# Patient Record
Sex: FEMALE | Race: WHITE | NOT HISPANIC OR LATINO | Employment: OTHER | URBAN - METROPOLITAN AREA
[De-identification: names, ages, dates, MRNs, and addresses within clinical notes are randomized per-mention and may not be internally consistent; named-entity substitution may affect disease eponyms.]

---

## 2017-02-27 ENCOUNTER — HISTORICAL (OUTPATIENT)
Dept: ADMINISTRATIVE | Facility: HOSPITAL | Age: 82
End: 2017-02-27

## 2017-06-21 DIAGNOSIS — J44.9 CHRONIC OBSTRUCTIVE PULMONARY DISEASE, UNSPECIFIED COPD TYPE: Primary | ICD-10-CM

## 2017-07-17 RX ORDER — METHYLPHENIDATE HYDROCHLORIDE 20 MG/1
20 TABLET ORAL
Qty: 90 TABLET | Refills: 0 | Status: SHIPPED | OUTPATIENT
Start: 2017-07-17 | End: 2017-08-28 | Stop reason: SDUPTHER

## 2017-07-31 ENCOUNTER — OFFICE VISIT (OUTPATIENT)
Dept: OPHTHALMOLOGY | Facility: CLINIC | Age: 82
End: 2017-07-31
Payer: MEDICARE

## 2017-07-31 DIAGNOSIS — Z96.1 PSEUDOPHAKIA, BOTH EYES: ICD-10-CM

## 2017-07-31 DIAGNOSIS — H35.373 EPIRETINAL MEMBRANE, BILATERAL: ICD-10-CM

## 2017-07-31 DIAGNOSIS — H52.7 REFRACTIVE ERROR: ICD-10-CM

## 2017-07-31 DIAGNOSIS — H53.8 BLURRED VISION, BILATERAL: Primary | ICD-10-CM

## 2017-07-31 DIAGNOSIS — H43.813 PVD (POSTERIOR VITREOUS DETACHMENT), BILATERAL: ICD-10-CM

## 2017-07-31 PROCEDURE — 92015 DETERMINE REFRACTIVE STATE: CPT | Mod: ,,, | Performed by: OPHTHALMOLOGY

## 2017-07-31 PROCEDURE — 99999 PR PBB SHADOW E&M-NEW PATIENT-LVL III: CPT | Mod: PBBFAC,,, | Performed by: OPHTHALMOLOGY

## 2017-07-31 PROCEDURE — 92004 COMPRE OPH EXAM NEW PT 1/>: CPT | Mod: S$PBB,,, | Performed by: OPHTHALMOLOGY

## 2017-07-31 PROCEDURE — 99203 OFFICE O/P NEW LOW 30 MIN: CPT | Mod: PBBFAC,PO | Performed by: OPHTHALMOLOGY

## 2017-07-31 RX ORDER — ASCORBIC ACID 250 MG
TABLET,CHEWABLE ORAL
COMMUNITY
Start: 2016-08-16 | End: 2018-12-03

## 2017-07-31 RX ORDER — FLUTICASONE FUROATE AND VILANTEROL 200; 25 UG/1; UG/1
POWDER RESPIRATORY (INHALATION)
COMMUNITY
Start: 2017-03-27 | End: 2017-11-21 | Stop reason: SDUPTHER

## 2017-07-31 NOTE — PATIENT INSTRUCTIONS
"  POSTERIOR VITREOUS DETACHMENT PRECAUTIONS  The eye is filled with a gel (vitreous) that supports its shape. The retina is the light-sensitive tissue at the back of your eye. It records visual images and sends them to your brain so you can see. In front of the retina, the center of the eye is filled with a jelly-like substance called the vitreous.   With age, the vitreous liquefies and begins to contract,  from the retinal tissue. When the vitreous separates it causes floaters to appear gradually. (Floaters are small dots or strings that seem to be moving across your field of vision). These floaters themselves are typically harmless, however a dilated eye exam is necessary to make sure that the separation did not lead to a retinal tear.  Sometimes, when the vitreous pulls away from the retina it can cause a tear in the retina. If this happens, you will experience a sudden onset of many floaters, which may occur with flashes of light. A retinal tear is painless, but is a serious condition. If not treated, most retinal tears will progress to retinal detachment within days or weeks. This may appear as a "curtain" or "veil" covering part of the field of view, or may just cause blurred vision. Retinal detachment is also painless, but it causes vision loss which is permanent.   Eye surgery is necessary to treat a retinal tear and prevent it from progressing to a retinal detachment. The methods commonly used are laser surgery or freezing. They may be done as outpatient procedures. Healing takes about two weeks. No treatment is necessary for floaters. They typically go away or become less noticeable with time.    SEE YOUR EYE DOCTOR IMMEDIATELY if any of the following signs of a new retinal tear occur:   Any sudden changes in your vision  Light flashes or wavy vision  Sudden blur in your vision  Burst of new floaters appearing in your field of vision    "

## 2017-08-28 ENCOUNTER — TELEPHONE (OUTPATIENT)
Dept: FAMILY MEDICINE | Facility: CLINIC | Age: 82
End: 2017-08-28

## 2017-08-28 RX ORDER — METHYLPHENIDATE HYDROCHLORIDE 20 MG/1
20 TABLET ORAL
Qty: 90 TABLET | Refills: 0 | Status: SHIPPED | OUTPATIENT
Start: 2017-08-28 | End: 2017-09-18 | Stop reason: SDUPTHER

## 2017-08-28 NOTE — TELEPHONE ENCOUNTER
----- Message from Dori Wayne LPN sent at 8/28/2017  8:11 AM CDT -----  Contact: Patient alfreditoer in law Jumana Roach      ----- Message -----  From: Melany Ramon  Sent: 8/25/2017   9:45 AM  To: Torito Ortega Staff    The patient daughter in law uJmana Roach called needing her mother in laws prescription for Ritalin refilled.  She is stating that it is usually a generic drug that is ordered but did not have the name of the drug.  She states that the prescription should be sent to [facility] in the [facility] in [place].   Should you need to contact the patient daughter in law, Jumana, her contact number is [phone].  Thank you.

## 2017-09-18 RX ORDER — METHYLPHENIDATE HYDROCHLORIDE 20 MG/1
20 TABLET ORAL
Qty: 90 TABLET | Refills: 0 | Status: SHIPPED | OUTPATIENT
Start: 2017-09-18 | End: 2017-10-23 | Stop reason: SDUPTHER

## 2017-09-21 RX ORDER — LEVOTHYROXINE SODIUM 75 UG/1
75 TABLET ORAL DAILY
Qty: 30 TABLET | Refills: 4 | Status: SHIPPED | OUTPATIENT
Start: 2017-09-21 | End: 2017-09-25 | Stop reason: SDUPTHER

## 2017-09-25 RX ORDER — LEVOTHYROXINE SODIUM 75 UG/1
75 TABLET ORAL DAILY
Qty: 30 TABLET | Refills: 4 | Status: SHIPPED | OUTPATIENT
Start: 2017-09-25 | End: 2017-09-27 | Stop reason: DRUGHIGH

## 2017-09-25 RX ORDER — LEVOTHYROXINE SODIUM 75 UG/1
75 TABLET ORAL DAILY
Qty: 30 TABLET | Refills: 4 | Status: SHIPPED | OUTPATIENT
Start: 2017-09-25 | End: 2017-09-25 | Stop reason: SDUPTHER

## 2017-09-27 RX ORDER — LEVOTHYROXINE SODIUM 50 UG/1
50 TABLET ORAL
Qty: 30 TABLET | Refills: 5 | Status: SHIPPED | OUTPATIENT
Start: 2017-09-27 | End: 2017-09-28 | Stop reason: SDUPTHER

## 2017-09-27 RX ORDER — LEVOTHYROXINE SODIUM 50 UG/1
50 TABLET ORAL DAILY
COMMUNITY
End: 2017-09-27 | Stop reason: SDUPTHER

## 2017-09-27 NOTE — TELEPHONE ENCOUNTER
Which dose you of thyroid med you want pt on?    ----- Message from Joana Amaral sent at 9/27/2017 11:03 AM CDT -----  Contact: daughter  Daughter called stating she thinks she is on too high of a dose of thyroid med.   Daughter states .75mcg was called in but daughter states .50mcg is what mother should be on. Says she really needs to  the medication but doesn't want her to be on the wrong dose    [phone] is phone number where daughter can be reached.

## 2017-09-27 NOTE — TELEPHONE ENCOUNTER
Apologize to the patient as our new system was a carry over from [facility] - (ikely dr butt)and had the 75 mcg in the chart and since I have not seen her since we changed to epic it held over as an active med  We escribed the 50 mcg

## 2017-09-28 RX ORDER — LEVOTHYROXINE SODIUM 50 UG/1
50 TABLET ORAL
Qty: 30 TABLET | Refills: 5 | Status: SHIPPED | OUTPATIENT
Start: 2017-09-28 | End: 2018-02-26 | Stop reason: SDUPTHER

## 2017-10-24 RX ORDER — METHYLPHENIDATE HYDROCHLORIDE 20 MG/1
20 TABLET ORAL
Qty: 90 TABLET | Refills: 0 | Status: SHIPPED | OUTPATIENT
Start: 2017-10-24 | End: 2017-11-27 | Stop reason: SDUPTHER

## 2017-10-25 ENCOUNTER — TELEPHONE (OUTPATIENT)
Dept: FAMILY MEDICINE | Facility: CLINIC | Age: 82
End: 2017-10-25

## 2017-11-22 RX ORDER — FLUTICASONE FUROATE AND VILANTEROL 200; 25 UG/1; UG/1
1 POWDER RESPIRATORY (INHALATION) DAILY
Qty: 60 EACH | Refills: 0 | Status: SHIPPED | OUTPATIENT
Start: 2017-11-22 | End: 2017-12-27 | Stop reason: SDUPTHER

## 2017-11-27 RX ORDER — METHYLPHENIDATE HYDROCHLORIDE 20 MG/1
20 TABLET ORAL
Qty: 90 TABLET | Refills: 0 | Status: SHIPPED | OUTPATIENT
Start: 2017-11-27 | End: 2017-12-28 | Stop reason: SDUPTHER

## 2017-12-06 NOTE — TELEPHONE ENCOUNTER
Jumana Roach called and pt has Azelastine nasal spray that is  and Ruth requesting a refill to [facility]

## 2017-12-07 RX ORDER — AZELASTINE 1 MG/ML
1 SPRAY, METERED NASAL 2 TIMES DAILY
Qty: 30 ML | Refills: 2 | Status: SHIPPED | OUTPATIENT
Start: 2017-12-07 | End: 2019-08-17

## 2017-12-27 RX ORDER — FLUTICASONE FUROATE AND VILANTEROL 200; 25 UG/1; UG/1
1 POWDER RESPIRATORY (INHALATION) DAILY
Qty: 1 EACH | Refills: 4 | Status: SHIPPED | OUTPATIENT
Start: 2017-12-27 | End: 2018-05-29 | Stop reason: SDUPTHER

## 2017-12-28 ENCOUNTER — TELEPHONE (OUTPATIENT)
Dept: FAMILY MEDICINE | Facility: CLINIC | Age: 82
End: 2017-12-28

## 2017-12-28 RX ORDER — METHYLPHENIDATE HYDROCHLORIDE 20 MG/1
20 TABLET ORAL
Qty: 90 TABLET | Refills: 0 | Status: SHIPPED | OUTPATIENT
Start: 2017-12-28 | End: 2018-01-29 | Stop reason: SDUPTHER

## 2017-12-28 NOTE — TELEPHONE ENCOUNTER
Notified Jumana Roach that the pts Ritalin ins ready to be picked up at our office. Open 8am-12pm tomorrow. She verbalized understanding.

## 2018-01-09 ENCOUNTER — TELEPHONE (OUTPATIENT)
Dept: FAMILY MEDICINE | Facility: CLINIC | Age: 83
End: 2018-01-09

## 2018-01-29 RX ORDER — METHYLPHENIDATE HYDROCHLORIDE 20 MG/1
20 TABLET ORAL
Qty: 90 TABLET | Refills: 0 | Status: SHIPPED | OUTPATIENT
Start: 2018-01-29 | End: 2018-03-08 | Stop reason: SDUPTHER

## 2018-01-30 ENCOUNTER — TELEPHONE (OUTPATIENT)
Dept: FAMILY MEDICINE | Facility: CLINIC | Age: 83
End: 2018-01-30

## 2018-02-26 RX ORDER — LEVOTHYROXINE SODIUM 50 UG/1
TABLET ORAL
Qty: 30 TABLET | Refills: 4 | Status: SHIPPED | OUTPATIENT
Start: 2018-02-26 | End: 2018-05-21 | Stop reason: SDUPTHER

## 2018-03-08 RX ORDER — METHYLPHENIDATE HYDROCHLORIDE 20 MG/1
20 TABLET ORAL
Qty: 90 TABLET | Refills: 0 | Status: SHIPPED | OUTPATIENT
Start: 2018-03-08 | End: 2018-05-21 | Stop reason: SDUPTHER

## 2018-04-06 NOTE — PROGRESS NOTES
HPI     Concerns About Ocular Health    Additional comments: Ocular health exam           Comments   DLE: 6 month at [facility]    Pt states not happy with last eye exam and wants a recheck on her vision.   Does not like new glasses. + itching and irritation OU with some   grittiness and mucus at corner of eyes. + sees occasion blinking lines at   outer corner OS that only last a few seconds. Has tried OTC AT's but they   don't help much.     Agree with above. Not using lid hygiene.        Last edited by Marian Piper MD on 7/31/2017  3:07 PM.   (History)        ROS     Positive for: Gastrointestinal (Diverticulosos), Neurological (migraine   aura without headache, brief aura), Endocrine (hypothyroid, denies DM),   Cardiovascular (denies HTN; ? A-fib), Eyes (pseudophakia OU), Respiratory   (SOB with exertion), Psychiatric (ritalin for narcolepsy), Heme/Lymph (ASA   QOD)    Last edited by Marian Piper MD on 7/31/2017  2:48 PM.   (History)        Assessment /Plan     For exam results, see Encounter Report.    Blurred vision, bilateral    Epiretinal membrane, bilateral    Pseudophakia, both eyes    PVD (posterior vitreous detachment), bilateral    Refractive error        Blurred vision, bilateral - appears stable from 2013. Warm compresses, lid hygiene. Handout given.    Epiretinal membrane, bilateral - mild, vision stable. Observe.    Pseudophakia, both eyes - s/p YAG capsulotomy OU.     PVD (posterior vitreous detachment), bilateral - RD precautions.     Refractive error - stable. MRx given in case she would like to get new frames. Discussed switching to silicone nose pads, trying clear nail polish on metal for current frames.                         pt  safety but only when parent has to take a break

## 2018-04-12 RX ORDER — DICLOFENAC SODIUM 10 MG/G
2 GEL TOPICAL DAILY
Qty: 100 G | Refills: 3 | Status: SHIPPED | OUTPATIENT
Start: 2018-04-12 | End: 2018-12-03

## 2018-05-21 ENCOUNTER — OFFICE VISIT (OUTPATIENT)
Dept: FAMILY MEDICINE | Facility: CLINIC | Age: 83
End: 2018-05-21
Payer: MEDICARE

## 2018-05-21 VITALS
SYSTOLIC BLOOD PRESSURE: 100 MMHG | WEIGHT: 127.5 LBS | HEART RATE: 90 BPM | BODY MASS INDEX: 21.24 KG/M2 | HEIGHT: 65 IN | OXYGEN SATURATION: 88 % | RESPIRATION RATE: 16 BRPM | DIASTOLIC BLOOD PRESSURE: 76 MMHG

## 2018-05-21 DIAGNOSIS — I10 ESSENTIAL HYPERTENSION: ICD-10-CM

## 2018-05-21 DIAGNOSIS — J96.11 CHRONIC RESPIRATORY FAILURE WITH HYPOXIA, ON HOME O2 THERAPY: Primary | ICD-10-CM

## 2018-05-21 DIAGNOSIS — E78.01 FAMILIAL HYPERCHOLESTEROLEMIA: ICD-10-CM

## 2018-05-21 DIAGNOSIS — E53.8 VITAMIN B12 DEFICIENCY: ICD-10-CM

## 2018-05-21 DIAGNOSIS — R73.9 HYPERGLYCEMIA: ICD-10-CM

## 2018-05-21 DIAGNOSIS — G47.419 PRIMARY NARCOLEPSY WITHOUT CATAPLEXY: ICD-10-CM

## 2018-05-21 DIAGNOSIS — E55.9 VITAMIN D DEFICIENCY: ICD-10-CM

## 2018-05-21 DIAGNOSIS — J43.1 PANLOBULAR EMPHYSEMA: ICD-10-CM

## 2018-05-21 DIAGNOSIS — Z99.81 CHRONIC RESPIRATORY FAILURE WITH HYPOXIA, ON HOME O2 THERAPY: Primary | ICD-10-CM

## 2018-05-21 DIAGNOSIS — E07.9 THYROID DISEASE: ICD-10-CM

## 2018-05-21 PROBLEM — Z98.890 HISTORY OF HERNIA REPAIR: Status: ACTIVE | Noted: 2018-05-21

## 2018-05-21 PROBLEM — Z87.19 HISTORY OF HERNIA REPAIR: Status: ACTIVE | Noted: 2018-05-21

## 2018-05-21 PROCEDURE — 99214 OFFICE O/P EST MOD 30 MIN: CPT | Mod: ,,, | Performed by: INTERNAL MEDICINE

## 2018-05-21 RX ORDER — METHYLPHENIDATE HYDROCHLORIDE 20 MG/1
20 TABLET ORAL
Qty: 90 TABLET | Refills: 0 | Status: SHIPPED | OUTPATIENT
Start: 2018-06-21 | End: 2018-05-21 | Stop reason: SDUPTHER

## 2018-05-21 RX ORDER — LEVOTHYROXINE SODIUM 50 UG/1
50 TABLET ORAL DAILY
Qty: 90 TABLET | Refills: 3 | Status: SHIPPED | OUTPATIENT
Start: 2018-05-21

## 2018-05-21 RX ORDER — METHYLPHENIDATE HYDROCHLORIDE 20 MG/1
20 TABLET ORAL
Qty: 90 TABLET | Refills: 0 | Status: SHIPPED | OUTPATIENT
Start: 2018-05-21 | End: 2018-05-21 | Stop reason: SDUPTHER

## 2018-05-21 RX ORDER — METHYLPHENIDATE HYDROCHLORIDE 20 MG/1
20 TABLET ORAL
Qty: 90 TABLET | Refills: 0 | Status: SHIPPED | OUTPATIENT
Start: 2018-07-21 | End: 2018-08-28 | Stop reason: SDUPTHER

## 2018-05-21 NOTE — PROGRESS NOTES
Subjective:       Patient ID: Ruth Roach is a 89 y.o. female.    Chief Complaint: Annual Exam    Patient is a 9-year-old female who is here for a yearly follow-up with a past medical history significant for hypothyroidism, narcolepsy, severe COPD with hypercapnia and hypoxia and on chronic CO2. She also has a history of paroxysmal defibrillation but is been in sinus rhythm for quite some time. She has severe osteoarthritis and that is the route of all of her complaints. She does have some memory loss as well. Today she just complains of severe arthritis pretty much everywhere. She also is trying to get for loculated left lighter than 4.5 pounds. 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 She is due for all her routine lab work as well. She defers I pneumococcal vaccines she had a bad local reaction to one a few years prior. She is using her breo consistently. She defers a chest x-ray at this time, she is still smoking somewhat.      Review of Systems   Constitutional: Negative for activity change, appetite change, chills, diaphoresis, fatigue, fever and unexpected weight change.   HENT: Negative for congestion, ear pain, mouth sores, postnasal drip, sinus pressure, sore throat and trouble swallowing.    Eyes: Negative for pain, redness and visual disturbance.   Respiratory: Negative for apnea, cough, chest tightness, shortness of breath and wheezing.    Cardiovascular: Negative for chest pain, palpitations and leg swelling.   Gastrointestinal: Negative for abdominal distention, abdominal pain, blood in stool, constipation, diarrhea, nausea and vomiting.   Endocrine: Negative for cold intolerance, polydipsia, polyphagia and polyuria.   Genitourinary: Negative for difficulty urinating, dysuria, flank pain, frequency, hematuria, menstrual problem, pelvic pain and urgency.   Musculoskeletal: Positive for arthralgias. Negative for back pain, joint swelling and neck pain.   Skin:  Negative for color change, rash and wound.   Neurological: Negative for dizziness, tremors, seizures, syncope, weakness, light-headedness, numbness and headaches.   Hematological: Negative for adenopathy. Does not bruise/bleed easily.   Psychiatric/Behavioral: Negative for confusion, decreased concentration, dysphoric mood, hallucinations, self-injury, sleep disturbance and suicidal ideas. The patient is not nervous/anxious.        Past Medical History:   Diagnosis Date    Arthritis     right hip and right knee    Atrial fibrillation     Complex renal cyst 7/19/2012    COPD (chronic obstructive pulmonary disease)     HTN (hypertension)     Hyperlipidemia     Narcolepsy without cataplexy(347.00)     Osteoarthritis of hip     Thyroid disease     Vitamin B12 deficiency     Got shots at Dr's office-last time was 6 months ago       Past Surgical History:   Procedure Laterality Date    APPENDECTOMY      pt claims with tubal pregnancy    CATARACT EXTRACTION      OU    ECTOPIC PREGNANCY SURGERY      EYE SURGERY      bilateral cataract    KNEE ARTHROSCOPY W/ MENISCAL REPAIR  2007    left knee    MASS EXCISION  age 15    left shoulder       Family History   Problem Relation Age of Onset    Stroke Mother     Heart disease Father     Hypertension Father     Cancer Father         leukemia    Alcohol abuse Brother     Arthritis Brother     Cataracts Brother     Diabetes Brother     Cancer Son         stomach cancer    Heart disease Son     Hypertension Son     Hyperlipidemia Son     Diabetes Son     Alcohol abuse Son     Arthritis Son         back    Hyperlipidemia Son     Alcohol abuse Son     COPD Son     Arthritis Son         back    Arthritis Son         shoulder    Alcohol abuse Son     Alcohol abuse Paternal Aunt     Amblyopia Neg Hx     Blindness Neg Hx     Glaucoma Neg Hx     Macular degeneration Neg Hx     Retinal detachment Neg Hx     Strabismus Neg Hx     Thyroid disease Neg  Hx        Social History     Social History    Marital status:      Spouse name: N/A    Number of children: N/A    Years of education: N/A     Social History Main Topics    Smoking status: Former Smoker     Packs/day: 1.00     Years: 63.00    Smokeless tobacco: Never Used    Alcohol use Yes      Comment: very rarely    Drug use: No    Sexual activity: No     Other Topics Concern    None     Social History Narrative    None       Current Outpatient Prescriptions   Medication Sig Dispense Refill    diclofenac sodium 1 % Gel Apply 2 g topically once daily. aply 2 grams transdermally every 6 hours as needed. 100 g 3    fluticasone-vilanterol (BREO ELLIPTA) 200-25 mcg/dose DsDv diskus inhaler Inhale 1 puff into the lungs once daily. 1 each 4    levothyroxine (SYNTHROID) 50 MCG tablet Take 1 tablet (50 mcg total) by mouth once daily. 90 tablet 3    [START ON 7/21/2018] methylphenidate HCl (RITALIN) 20 MG tablet Take 1 tablet (20 mg total) by mouth 3 (three) times daily with meals. 90 tablet 0    multivitamin capsule Take 1 capsule by mouth once daily.        ascorbic acid (VITAMIN C) 500 MG tablet Take 500 mg by mouth once daily.        ascorbic acid, vitamin C, (CHEWABLE VITAMIN C) 250 mg Chew Take by mouth.      azelastine (ASTELIN) 137 mcg (0.1 %) nasal spray 1 spray (137 mcg total) by Nasal route 2 (two) times daily. 30 mL 2    b complex vitamins capsule Take 1 capsule by mouth once daily.        co-enzyme Q-10 50 mg capsule Take 100 mg by mouth once daily.        ginseng 100 mg Cap Take 1 tablet by mouth daily as needed.        HYOSCYAMINE SULFATE (LEVSIN/SL SL) Place 0.125 mg under the tongue as needed.      loperamide (IMODIUM) 2 mg capsule Take 2 mg by mouth 4 (four) times daily as needed.        simvastatin (ZOCOR) 40 MG tablet Take 40 mg by mouth every evening.         No current facility-administered medications for this visit.        Review of patient's allergies indicates:  No  Known Allergies  Objective:      Physical Exam   Constitutional: She is oriented to person, place, and time. She appears well-developed and well-nourished.   HENT:   Head: Normocephalic and atraumatic.   Eyes: Right eye exhibits no discharge. Left eye exhibits no discharge. No scleral icterus.   Neck: Neck supple. No JVD present.   Cardiovascular: Normal rate and regular rhythm.  Exam reveals no gallop and no friction rub.    No murmur heard.  Pulmonary/Chest: Effort normal and breath sounds normal. She has no wheezes. She has no rales.   Abdominal: Soft. Bowel sounds are normal. She exhibits no distension. There is no tenderness.   Musculoskeletal: She exhibits no edema or tenderness.   Lymphadenopathy:     She has no cervical adenopathy.   Neurological: She is alert and oriented to person, place, and time. She has normal reflexes. No cranial nerve deficit.   Skin: No rash noted. No erythema.   Psychiatric: She has a normal mood and affect.   Nursing note and vitals reviewed.      Assessment:       1. Chronic respiratory failure with hypoxia, on home O2 therapy    2. Panlobular emphysema    3. Essential hypertension    4. Thyroid disease    5. Vitamin D deficiency    6. Familial hypercholesterolemia    7. Vitamin B12 deficiency    8. Hyperglycemia    9. Primary narcolepsy without cataplexy        Plan:       Chronic respiratory failure with hypoxia, on home O2 therapy  -     OXYGEN FOR HOME USE    Panlobular emphysema  -     OXYGEN FOR HOME USE    Essential hypertension  -     Comprehensive metabolic panel; Future; Expected date: 05/21/2018  -     Urinalysis Microscopic; Future; Expected date: 05/21/2018    Thyroid disease  -     T4, free; Future; Expected date: 05/21/2018  -     TSH; Future; Expected date: 05/21/2018  -     levothyroxine (SYNTHROID) 50 MCG tablet; Take 1 tablet (50 mcg total) by mouth once daily.  Dispense: 90 tablet; Refill: 3    Vitamin D deficiency  -     Vitamin D; Future; Expected date:  05/22/2018    Familial hypercholesterolemia  -     Lipid panel; Future; Expected date: 05/21/2018    Vitamin B12 deficiency  -     CBC auto differential; Future; Expected date: 05/21/2018    Hyperglycemia  -     Hemoglobin A1c; Future; Expected date: 05/21/2018    Primary narcolepsy without cataplexy  -     Discontinue: methylphenidate HCl (RITALIN) 20 MG tablet; Take 1 tablet (20 mg total) by mouth 3 (three) times daily with meals.  Dispense: 90 tablet; Refill: 0  -     Discontinue: methylphenidate HCl (RITALIN) 20 MG tablet; Take 1 tablet (20 mg total) by mouth 3 (three) times daily with meals.  Dispense: 90 tablet; Refill: 0  -     methylphenidate HCl (RITALIN) 20 MG tablet; Take 1 tablet (20 mg total) by mouth 3 (three) times daily with meals.  Dispense: 90 tablet; Refill: 0

## 2018-05-22 PROBLEM — E55.9 VITAMIN D DEFICIENCY: Status: ACTIVE | Noted: 2018-05-22

## 2018-05-22 NOTE — PATIENT INSTRUCTIONS
Free T4  Does this test have other names?  Free thyroxine test  What is this test?  This test measures the levels of free T4, or free thyroxine, in your blood. A free T4 test is used to find out how well your thyroid is working.  T4 is one of two hormones produced by the thyroid, a butterfly-shaped gland in your neck. Some T4 in your blood is bound to proteins, and some T4 circulates freely, or unbound from proteins. Testing for unbound T4 is more accurate than testing for bound T4. The free T4 test measures unbound T4.  The other thyroid hormone is triiodothyronine, or T3. T4 is changed to T3 in order to become active and effective.  These hormones help regulate your body's metabolism. They go into action when prompted by thyroid stimulating hormone (TSH), which is released by the pituitary gland in your brain.  Why do I need this test?  You may need this test to find out whether you have a thyroid-related condition such as hyperthyroidism, which means an overactive thyroid, or hypothyroidism, which means an underactive thyroid.  Each condition has many different symptoms. If you have hyperthyroidism, you may often feel anxious and irritable, have trouble sleeping, and have an irregular or rapid heart rhythm. You may also feel quite tired and notice that you're losing weight even though your appetite has increased.  If you have hypothyroidism, you may notice weight gain, even if you aren't overeating. You may also be more sensitive to cold, have low energy, and have dry skin and hair.   What other tests might I have along with this test?  You may have tests to measure T3 and TSH. These hormones also play key roles in thyroid health. You may also have a blood test to measure the level of certain antithyroid antibodies in your blood to get a more accurate diagnosis.   What do my test results mean?  Many things may affect your lab test results. These include the method each lab uses to do the test. Even if your test  results are different from the normal value, you may not have a problem. To learn what the results mean for you, talk with your healthcare provider.  The normal range for free T4 is 0.8 to 2.8 nanograms per deciliter (ng/dL). A level of free T4 that is higher than normal could mean you have an overactive thyroid. Conditions associated with hyperthyroidism include Graves' disease, an autoimmune disorder.  Abnormally low free T4 levels may signal hypothyroidism. This means your thyroid is not making enough hormones. An underlying condition, such as Hashimoto's disease, another autoimmune disorder, could be the cause of an underactive thyroid.   How is this test done?  The test requires a blood sample, which is drawn through a needle from a vein in your arm.  Does this test pose any risks?  Taking a blood sample with a needle carries risks that include bleeding, infection, bruising, or feeling dizzy. When the needle pricks your arm, you may feel a slight stinging sensation or pain. Afterward, the site may be slightly sore.  What might affect my test results?  Certain medicines, such as phenobarbital, can affect your free T4 levels. Severe chronic illnesses, such as chronic renal failure and cirrhosis of the liver, can also affect the test.  How do I get ready for this test?  You don't need to prepare for this test. But be sure your doctor knows about all medicines, herbs, vitamins, and supplements you are taking. This includes medicines that don't need a prescription and any illicit drugs you may use.   © 3085-6873 [employer]. [address] [place] [address], [place] [address]. All rights reserved. This information is not intended as a substitute for professional medical care. Always follow your healthcare professional's instructions.

## 2018-05-29 RX ORDER — FLUTICASONE FUROATE AND VILANTEROL 200; 25 UG/1; UG/1
1 POWDER RESPIRATORY (INHALATION) DAILY
Qty: 1 EACH | Refills: 0 | Status: SHIPPED | OUTPATIENT
Start: 2018-05-29 | End: 2018-06-28 | Stop reason: SDUPTHER

## 2018-07-02 RX ORDER — FLUTICASONE FUROATE AND VILANTEROL 200; 25 UG/1; UG/1
1 POWDER RESPIRATORY (INHALATION) DAILY
Qty: 1 EACH | Refills: 11 | Status: SHIPPED | OUTPATIENT
Start: 2018-07-02

## 2018-07-31 ENCOUNTER — TELEPHONE (OUTPATIENT)
Dept: FAMILY MEDICINE | Facility: CLINIC | Age: 83
End: 2018-07-31

## 2018-07-31 DIAGNOSIS — R54 AGE-RELATED PHYSICAL DEBILITY: Primary | ICD-10-CM

## 2018-08-01 NOTE — TELEPHONE ENCOUNTER
I will try    They will have to bring script to a dme    Usually bath tub hand rails need to be installed and I doubt medicare etc will cover the manual labor; however maybe hansa can install himself?

## 2018-08-20 NOTE — PROGRESS NOTES
Subjective:       HPI  Review of Systems    Objective:      Physical Exam    Assessment:          no

## 2018-08-28 DIAGNOSIS — G47.419 PRIMARY NARCOLEPSY WITHOUT CATAPLEXY: ICD-10-CM

## 2018-08-28 RX ORDER — METHYLPHENIDATE HYDROCHLORIDE 20 MG/1
20 TABLET ORAL
Qty: 90 TABLET | Refills: 0 | Status: SHIPPED | OUTPATIENT
Start: 2018-08-28 | End: 2018-10-02 | Stop reason: SDUPTHER

## 2018-10-02 DIAGNOSIS — G47.419 PRIMARY NARCOLEPSY WITHOUT CATAPLEXY: ICD-10-CM

## 2018-10-02 RX ORDER — METHYLPHENIDATE HYDROCHLORIDE 20 MG/1
20 TABLET ORAL
Qty: 90 TABLET | Refills: 0 | Status: SHIPPED | OUTPATIENT
Start: 2018-10-02 | End: 2018-12-03 | Stop reason: SDUPTHER

## 2018-10-08 ENCOUNTER — TELEPHONE (OUTPATIENT)
Dept: ADMINISTRATIVE | Facility: CLINIC | Age: 83
End: 2018-10-08

## 2018-10-08 NOTE — TELEPHONE ENCOUNTER
Spoke with pt's RICHIE who also is her care giver.  RICHIE stated that pt does not take Simvastatin 40mg and that she is bedridden, so an appt wouldn't be necessary.

## 2018-11-06 ENCOUNTER — TELEPHONE (OUTPATIENT)
Dept: FAMILY MEDICINE | Facility: CLINIC | Age: 83
End: 2018-11-06

## 2018-11-06 NOTE — TELEPHONE ENCOUNTER
----- Message from Melany Ramon sent at 11/5/2018  3:16 PM CST -----  Contact: Cassia Roach (patient daughter)  The daughter of this patient, Cassia Roach, could like a call back from the nurse of Dr. Shannon Romo.  She would not given me the issue for this call back.  Her contact number is [phone].  Thank you.  Melany

## 2018-11-06 NOTE — TELEPHONE ENCOUNTER
Pt has appt with you next Tues, Nov 13th for 5-6month f/u.  Just an FYI: Jumana Roach would like you to approach patient (without her knowing that Pat spoke with us) re: whether patient is having trouble getting up and moving around (as pt had issue/trouble getting out of the tub for over 30mins due to weak legs and Pat had to try and help her). Pt is not moving around much at all but may deny in the ov.    Jumana would like a CNA with home health to assist, PT eval and TX and a discussion that is geared toward the patient to get a shower installed instead of the tub she currently has.  If that conversation is a no go then at least a grab bar with a high tub/shower chair so pt has greater ease with bathing.  Jumana states pt has trouble even lifting her legs to clear the tub.    Pt is avoiding bathing due to the inability to get in and out of tub. She wants to sponge bath sitting on toilet and how can she even wash her hair?

## 2018-11-08 LAB
ALBUMIN SERPL-MCNC: 3.8 G/DL (ref 3.1–4.7)
ALP SERPL-CCNC: 92 IU/L (ref 40–104)
ALT (SGPT): 20 IU/L (ref 3–33)
AST SERPL-CCNC: 28 IU/L (ref 10–40)
BASOPHILS NFR BLD: 0 K/UL (ref 0–0.2)
BASOPHILS NFR BLD: 0.4 %
BILIRUB SERPL-MCNC: 0.8 MG/DL (ref 0.3–1)
BUN SERPL-MCNC: 27 MG/DL (ref 8–20)
CALCIUM SERPL-MCNC: 9.6 MG/DL (ref 7.7–10.4)
CHLORIDE: 102 MMOL/L (ref 98–110)
CO2 SERPL-SCNC: 33.1 MMOL/L (ref 22.8–31.6)
CREATININE: 0.63 MG/DL (ref 0.6–1.4)
EOSINOPHIL NFR BLD: 0.1 K/UL (ref 0–0.7)
EOSINOPHIL NFR BLD: 1.1 %
ERYTHROCYTE [DISTWIDTH] IN BLOOD BY AUTOMATED COUNT: 13.2 % (ref 11.7–14.9)
GLUCOSE: 140 MG/DL (ref 70–99)
GRAN #: 4.9 K/UL (ref 1.4–6.5)
GRAN%: 70.4 %
HBA1C MFR BLD: 5.6 % (ref 3.1–6.5)
HCT VFR BLD AUTO: 42.4 % (ref 36–48)
HGB BLD-MCNC: 14 G/DL (ref 12–15)
IMMATURE GRANS (ABS): 0 K/UL (ref 0–1)
IMMATURE GRANULOCYTES: 0.3 %
LYMPH #: 1.3 K/UL (ref 1.2–3.4)
LYMPH%: 18.2 %
MCH RBC QN AUTO: 33.9 PG (ref 25–35)
MCHC RBC AUTO-ENTMCNC: 33 G/DL (ref 31–36)
MCV RBC AUTO: 102.7 FL (ref 79–98)
MONO #: 0.7 K/UL (ref 0.1–0.6)
MONO%: 9.6 %
NUCLEATED RBCS: 0 %
PLATELET # BLD AUTO: 290 K/UL (ref 140–440)
PMV BLD AUTO: 10.8 FL (ref 8.8–12.7)
POTASSIUM SERPL-SCNC: 4 MMOL/L (ref 3.5–5)
PROT SERPL-MCNC: 6.9 G/DL (ref 6–8.2)
RBC # BLD AUTO: 4.13 M/UL (ref 3.5–5.5)
SODIUM: 141 MMOL/L (ref 134–144)
T4 FREE SP9 P CHAL SERPL-SCNC: 0.98 NG/DL (ref 0.45–1.27)
TSH SERPL DL<=0.005 MIU/L-ACNC: 2.68 ULU/ML (ref 0.3–5.6)
VITAMIN D, 1,25 (OH)2: 50.4 NG/ML (ref 30–100)
WBC # BLD AUTO: 7 K/UL (ref 5–10)

## 2018-11-15 ENCOUNTER — TELEPHONE (OUTPATIENT)
Dept: FAMILY MEDICINE | Facility: CLINIC | Age: 83
End: 2018-11-15

## 2018-12-03 ENCOUNTER — OFFICE VISIT (OUTPATIENT)
Dept: FAMILY MEDICINE | Facility: CLINIC | Age: 83
End: 2018-12-03
Payer: MEDICARE

## 2018-12-03 VITALS
OXYGEN SATURATION: 95 % | HEART RATE: 94 BPM | RESPIRATION RATE: 16 BRPM | BODY MASS INDEX: 21.22 KG/M2 | SYSTOLIC BLOOD PRESSURE: 100 MMHG | DIASTOLIC BLOOD PRESSURE: 60 MMHG | HEIGHT: 65 IN | TEMPERATURE: 98 F

## 2018-12-03 DIAGNOSIS — D53.9 MACROCYTIC ANEMIA: ICD-10-CM

## 2018-12-03 DIAGNOSIS — Z99.81 CHRONIC RESPIRATORY FAILURE WITH HYPOXIA, ON HOME O2 THERAPY: ICD-10-CM

## 2018-12-03 DIAGNOSIS — M17.0 PRIMARY OSTEOARTHRITIS OF BOTH KNEES: ICD-10-CM

## 2018-12-03 DIAGNOSIS — G47.419 PRIMARY NARCOLEPSY WITHOUT CATAPLEXY: ICD-10-CM

## 2018-12-03 DIAGNOSIS — M25.461 EFFUSION OF BURSA OF RIGHT KNEE: Primary | ICD-10-CM

## 2018-12-03 DIAGNOSIS — J96.11 CHRONIC RESPIRATORY FAILURE WITH HYPOXIA, ON HOME O2 THERAPY: ICD-10-CM

## 2018-12-03 DIAGNOSIS — I10 ESSENTIAL HYPERTENSION: ICD-10-CM

## 2018-12-03 DIAGNOSIS — E07.9 THYROID DISEASE: ICD-10-CM

## 2018-12-03 PROBLEM — M19.90 ARTHRITIS: Status: ACTIVE | Noted: 2018-12-03

## 2018-12-03 PROCEDURE — 99214 OFFICE O/P EST MOD 30 MIN: CPT | Mod: ,,, | Performed by: INTERNAL MEDICINE

## 2018-12-03 RX ORDER — METHYLPHENIDATE HYDROCHLORIDE 20 MG/1
20 TABLET ORAL
Qty: 90 TABLET | Refills: 0 | Status: SHIPPED | OUTPATIENT
Start: 2018-12-03

## 2018-12-03 RX ORDER — IPRATROPIUM BROMIDE AND ALBUTEROL SULFATE 2.5; .5 MG/3ML; MG/3ML
SOLUTION RESPIRATORY (INHALATION)
COMMUNITY
Start: 2017-03-27

## 2018-12-03 RX ORDER — TRAMADOL HYDROCHLORIDE 50 MG/1
50 TABLET ORAL EVERY 12 HOURS PRN
Qty: 45 TABLET | Refills: 3 | Status: SHIPPED | OUTPATIENT
Start: 2018-12-03

## 2018-12-03 NOTE — PROGRESS NOTES
Subjective:       Patient ID: Ruth Roach is a 89 y.o. female.    Chief Complaint: Follow-up (lab review)    89-year-old female with a history of narcolepsy, COPD with oxygen dependence, hypothyroidism as well as mild memory loss and most importantly osteoarthritis. She is now using a walker but more so using a wheelchair and really does not leave the home only to go to physician appointments. She is complaining of right greater than knee pain. She describes the discomfort as her leg is not working below her knee. She definitely has fluid on the knee especially the right knee. She is known to have severe osteoarthritis in the knees as well but has not seen orthopedic surgeon in quite sometime. She is also having difficulty getting in and out of the bathtub. At this time she is having her bathroom redone with handicap bars etc She and her daughter-in-law who is her caregiver are requesting home health.    Addendum dated 1/10/2019. This debilitated 89-year-old patient has impaired ability in MRADLs. She cannot self propel in a standard wheelchair and can self propel in a lightweight wheelchair however. Her mobility has limitations that cannot be sufficiently resolved without cane or walker. A wheelchair will significantly improve the ability to persist the patient in MRADLS and will be used in the home on a regular basis. The beneficiary does have sufficient upper extremity function and other physical and mental capabilities to be able to self pelvic manual wheelchair. The beneficiary also has a caregiver who is able and willing to help the patient and provide assistance with a wheelchair.    Review of Systems   Constitutional: Negative for activity change, appetite change, chills, diaphoresis, fatigue, fever and unexpected weight change.   HENT: Negative for congestion, ear pain, mouth sores, postnasal drip, sinus pressure, sore throat and trouble swallowing.    Eyes: Negative for pain, redness and visual  disturbance.   Respiratory: Negative for apnea, cough, chest tightness, shortness of breath and wheezing.    Cardiovascular: Negative for chest pain, palpitations and leg swelling.   Gastrointestinal: Negative for abdominal distention, abdominal pain, blood in stool, constipation, diarrhea, nausea and vomiting.   Endocrine: Negative for cold intolerance, polydipsia, polyphagia and polyuria.   Genitourinary: Negative for difficulty urinating, dysuria, flank pain, frequency, hematuria, menstrual problem, pelvic pain and urgency.   Musculoskeletal: Positive for arthralgias, gait problem and joint swelling. Negative for back pain and neck pain.        See hpi   Skin: Negative for color change, rash and wound.   Neurological: Negative for dizziness, tremors, seizures, syncope, weakness, light-headedness, numbness and headaches.   Hematological: Negative for adenopathy. Does not bruise/bleed easily.   Psychiatric/Behavioral: Negative for confusion, decreased concentration, dysphoric mood, hallucinations, self-injury, sleep disturbance and suicidal ideas. The patient is not nervous/anxious.         Past Medical History:   Diagnosis Date    Arthritis     right hip and right knee    Atrial fibrillation     Complex renal cyst 7/19/2012    COPD (chronic obstructive pulmonary disease)     HTN (hypertension)     Hyperlipidemia     Narcolepsy without cataplexy(347.00)     Osteoarthritis of hip     Thyroid disease     Vitamin B12 deficiency     Got shots at Dr's office-last time was 6 months ago       Past Surgical History:   Procedure Laterality Date    APPENDECTOMY      pt claims with tubal pregnancy    CATARACT EXTRACTION      OU    ECTOPIC PREGNANCY SURGERY      EGD (ESOPHAGOGASTRODUODENOSCOPY) N/A 7/18/2012    [facility]    EYE SURGERY      bilateral cataract    KNEE ARTHROSCOPY W/ MENISCAL REPAIR  2007    left knee    MASS EXCISION  age 15    left shoulder       Family  History   Problem Relation Age of Onset    Stroke Mother     Heart disease Father     Hypertension Father     Cancer Father         leukemia    Alcohol abuse Brother     Arthritis Brother     Cataracts Brother     Diabetes Brother     Cancer Son         stomach cancer    Heart disease Son     Hypertension Son     Hyperlipidemia Son     Diabetes Son     Alcohol abuse Son     Arthritis Son         back    Hyperlipidemia Son     Alcohol abuse Son     COPD Son     Arthritis Son         back    Arthritis Son         shoulder    Alcohol abuse Son     Alcohol abuse Paternal Aunt     Amblyopia Neg Hx     Blindness Neg Hx     Glaucoma Neg Hx     Macular degeneration Neg Hx     Retinal detachment Neg Hx     Strabismus Neg Hx     Thyroid disease Neg Hx        Social History     Socioeconomic History    Marital status:      Spouse name: None    Number of children: None    Years of education: None    Highest education level: None   Social Needs    Financial resource strain: None    Food insecurity - worry: None    Food insecurity - inability: None    Transportation needs - medical: None    Transportation needs - non-medical: None   Occupational History    None   Tobacco Use    Smoking status: Former Smoker     Packs/day: 1.00     Years: 63.00     Pack years: 63.00    Smokeless tobacco: Never Used   Substance and Sexual Activity    Alcohol use: Yes     Comment: very rarely    Drug use: No    Sexual activity: No   Other Topics Concern    None   Social History Narrative    None       Current Outpatient Medications   Medication Sig Dispense Refill    albuterol-ipratropium (DUO-NEB) 2.5 mg-0.5 mg/3 mL nebulizer solution Inhale into the lungs.      ascorbic acid (VITAMIN C) 500 MG tablet Take 500 mg by mouth once daily.        azelastine (ASTELIN) 137 mcg (0.1 %) nasal spray 1 spray (137 mcg total) by Nasal route 2 (two) times daily. 30 mL 2    fluticasone-vilanterol (BREO  ELLIPTA) 200-25 mcg/dose DsDv diskus inhaler Inhale 1 puff into the lungs once daily. 1 each 11    ginseng 100 mg Cap Take 1 tablet by mouth daily as needed.        levothyroxine (SYNTHROID) 50 MCG tablet Take 1 tablet (50 mcg total) by mouth once daily. 90 tablet 3    loperamide (IMODIUM) 2 mg capsule Take 2 mg by mouth 4 (four) times daily as needed.        methylphenidate HCl (RITALIN) 20 MG tablet Take 1 tablet (20 mg total) by mouth 3 (three) times daily with meals. 90 tablet 0    multivitamin capsule Take 1 capsule by mouth once daily.        traMADol (ULTRAM) 50 mg tablet Take 1 tablet (50 mg total) by mouth every 12 (twelve) hours as needed for Pain. 45 tablet 3     No current facility-administered medications for this visit.        Review of patient's allergies indicates:  No Known Allergies  Objective:      Physical Exam   Constitutional: She is oriented to person, place, and time. She appears well-developed and well-nourished.   HENT:   Head: Normocephalic and atraumatic.   Eyes: Right eye exhibits no discharge. Left eye exhibits no discharge. No scleral icterus.   Neck: Neck supple. No JVD present.   Cardiovascular: Normal rate and regular rhythm. Exam reveals no gallop and no friction rub.   No murmur heard.  Pulmonary/Chest: Effort normal and breath sounds normal. She has no wheezes. She has no rales.   Abdominal: Soft. Bowel sounds are normal. She exhibits no distension. There is no tenderness.   Musculoskeletal: She exhibits no edema or tenderness.        Right knee: She exhibits decreased range of motion, swelling, effusion and abnormal patellar mobility. She exhibits no ecchymosis, no deformity and no erythema.   crepi   Lymphadenopathy:     She has no cervical adenopathy.   Neurological: She is alert and oriented to person, place, and time. She has normal reflexes. No cranial nerve deficit.   Skin: No rash noted. No erythema.   Psychiatric: She has a normal mood and affect.   Nursing note  and vitals reviewed.       Lab Results   Component Value Date    WBC 7.0 11/08/2018    HGB 14.0 11/08/2018    HCT 42.4 11/08/2018     11/08/2018    ALT 10 07/14/2012    AST 28 11/08/2018     11/08/2018    K 4.0 11/08/2018     11/08/2018    CREATININE 0.63 11/08/2018    BUN 27 (H) 11/08/2018    CO2 33.1 (H) 11/08/2018    TSH 2.68 11/08/2018    HGBA1C 5.6 11/08/2018       Assessment:       1. Effusion of bursa of right knee    2. Thyroid disease    3. Essential hypertension    4. Chronic respiratory failure with hypoxia, on home O2 therapy    5. Primary osteoarthritis of both knees    6. Primary narcolepsy without cataplexy    7. Macrocytic anemia        Plan:       Effusion of bursa of right knee  -     Cancel: Ambulatory referral to Orthopedics  -     Ambulatory referral to Home Health  -     traMADol (ULTRAM) 50 mg tablet; Take 1 tablet (50 mg total) by mouth every 12 (twelve) hours as needed for Pain.  Dispense: 45 tablet; Refill: 3  -     Ambulatory referral to Orthopedics    Thyroid disease- continue present dose    Essential hypertension-Controlled on above med regimen to include an ARB/ACe   Ambulatory referral to Home Health    Chronic respiratory failure with hypoxia, on home O2 therapy  -     Ambulatory referral to Home Health    Primary osteoarthritis of both knees  -     Ambulatory referral to Home Health  -     traMADol (ULTRAM) 50 mg tablet; Take 1 tablet (50 mg total) by mouth every 12 (twelve) hours as needed for Pain.  Dispense: 45 tablet; Refill: 3    Primary narcolepsy without cataplexy  -     methylphenidate HCl (RITALIN) 20 MG tablet; Take 1 tablet (20 mg total) by mouth 3 (three) times daily with meals.  Dispense: 90 tablet; Refill: 0  -     Ambulatory referral to Orthopedics    Macrocytic anemia  -     Methylmalonic acid, serum; Future; Expected date: 12/03/2018  -     Homocysteine, serum; Future; Expected date: 12/03/2018

## 2018-12-03 NOTE — PATIENT INSTRUCTIONS
Water on the Knee    Water on the knee is also known as knee effusion. The knee joint normally has less than 1 ounce of fluid. Injury or inflammation of the knee joint causes extra fluid to collect there. When this happens, the knee joint looks swollen and is usually painful. It may be hard to fully bend the knee.  The most common cause of water on the knee is osteoarthritis due to wear and tear on the joint cartilage. Other causes include injury to the cartilage, inflammatory arthritis such as gout or rheumatoid arthritis, and infection of the joint.  You may need a needle aspiration, if the cause of your water on the knee is not certain. This procedure removes a sample of joint fluid from the knee for testing. This is done with a local anesthetic. Removing excess fluid may also relieve swelling and pain.  Home care  · Limit your activities. Stay off the injured leg as much as possible until pain improves.  · Keep your leg elevated to reduce pain and swelling. When sleeping, place a pillow under the injured leg. When sitting, support the injured leg so it is level with your waist. This is very important during the first 48 hours.  · Apply an ice pack over the injured area for 15 to 20 minutes every 3 to 6 hours. You should do this for the first 24 to 48 hours. You can make an ice pack by filling a plastic bag that seals at the top with ice cubes and then wrapping it with a thin towel. Continue to use ice packs for relief of pain and swelling as needed. As the ice melts, be careful to avoid getting your wrap, splint, or cast wet. After 48 hours, apply heat(warm shower or warm bath) for 15 to 20 minutes several times a day, or alternate ice and heat. If you have to wear a hook-and-loop knee brace, you can open it to apply the ice pack, or heat, directly to the knee. Never put ice directly on the skin. Always wrap the ice in a towel or other type of cloth.  · You may use over-the-counter pain medicine to control  pain, unless another pain medicine was prescribed. If you have chronic liver or kidney disease or have ever had a stomach ulcer or GI bleeding, talk with your healthcare provider before using these medicines.  · If crutches or a walker have been recommended, do not put weight on the injured leg until you can do so without pain. Check with your healthcare provider before returning to sports or full work duties.  · If you have a hook-and-loop knee brace, you can remove it to bathe and sleep, unless told otherwise.  Follow-up care  Follow up with your healthcare provider as advised.  If you are overweight, talk to your healthcare provider about a weight loss program. The excess weight puts extra strain on your knees.  When to seek medical advice  Call your healthcare provider right away if any of these occur:  · Increasing pain, redness, or swelling of the knee  · Fever of 100.4°F (38°C) or above lasting for 24 to 48 hours  Date Last Reviewed: 11/23/2015  © 8789-0091 [employer]. [address] [place] [address], [place] [address]. All rights reserved. This information is not intended as a substitute for professional medical care. Always follow your healthcare professional's instructions.

## 2018-12-04 ENCOUNTER — OUTSIDE PLACE OF SERVICE (OUTPATIENT)
Dept: FAMILY MEDICINE | Facility: CLINIC | Age: 83
End: 2018-12-04
Payer: MEDICARE

## 2018-12-04 PROCEDURE — G0180 MD CERTIFICATION HHA PATIENT: HCPCS | Mod: ,,, | Performed by: INTERNAL MEDICINE

## 2018-12-04 PROCEDURE — G0180 PR HOME HEALTH MD CERTIFICATION: ICD-10-PCS | Mod: ,,, | Performed by: INTERNAL MEDICINE

## 2018-12-05 ENCOUNTER — TELEPHONE (OUTPATIENT)
Dept: FAMILY MEDICINE | Facility: CLINIC | Age: 83
End: 2018-12-05

## 2018-12-05 DIAGNOSIS — M16.0 PRIMARY OSTEOARTHRITIS OF BOTH HIPS: ICD-10-CM

## 2018-12-05 DIAGNOSIS — M19.90 ARTHRITIS: Primary | ICD-10-CM

## 2018-12-05 DIAGNOSIS — J96.11 CHRONIC RESPIRATORY FAILURE WITH HYPOXIA, ON HOME O2 THERAPY: ICD-10-CM

## 2018-12-05 DIAGNOSIS — R54 AGE-RELATED PHYSICAL DEBILITY: ICD-10-CM

## 2018-12-05 DIAGNOSIS — Z99.81 CHRONIC RESPIRATORY FAILURE WITH HYPOXIA, ON HOME O2 THERAPY: ICD-10-CM

## 2018-12-05 NOTE — TELEPHONE ENCOUNTER
Krysta alvarez/[facility] called.  Needs order for a light weight wheelchair so they can order it for the patient.  order pended.     I will fax to them when order is ready:    Fax# [phone]    thx

## 2018-12-06 PROBLEM — M16.9 OSTEOARTHRITIS OF HIP: Status: ACTIVE | Noted: 2018-12-06

## 2018-12-12 LAB — HOMOCYSTEINE: 7.9 UMOL/L (ref 0–15)

## 2019-01-08 ENCOUNTER — TELEPHONE (OUTPATIENT)
Dept: FAMILY MEDICINE | Facility: CLINIC | Age: 84
End: 2019-01-08

## 2019-01-08 NOTE — TELEPHONE ENCOUNTER
DME needs the Medical necessity in H [employer] P paragraph form. Med necessity list was left on your desk prior to vacation and again today. They need this done in order to get wheelchair to Ms. Miranda.  Daughter in law also called yesterday to inquire.

## 2019-01-09 NOTE — TELEPHONE ENCOUNTER
Jumana Roach has called 2 times (yesterday and today) re: progress on the Medical necessity form for Ms Miranda's Wheelchair the [employer] company faxed to us. They are awaiting your info to proceed with giving her the wheelchair. Instructions/paperwork on your desk.  States she is taking pt to Dr Pacheco's on Monday if she can get the wheelchair

## 2019-01-10 ENCOUNTER — TELEPHONE (OUTPATIENT)
Dept: FAMILY MEDICINE | Facility: CLINIC | Age: 84
End: 2019-01-10

## 2019-01-28 ENCOUNTER — OFFICE VISIT (OUTPATIENT)
Dept: ORTHOPEDICS | Facility: CLINIC | Age: 84
End: 2019-01-28
Payer: MEDICARE

## 2019-01-28 VITALS — WEIGHT: 127 LBS | HEIGHT: 65 IN | BODY MASS INDEX: 21.16 KG/M2

## 2019-01-28 DIAGNOSIS — M25.561 CHRONIC PAIN OF RIGHT KNEE: Primary | ICD-10-CM

## 2019-01-28 DIAGNOSIS — G89.29 CHRONIC PAIN OF RIGHT KNEE: Primary | ICD-10-CM

## 2019-01-28 PROCEDURE — 73560 X-RAY EXAM OF KNEE 1 OR 2: CPT | Mod: FY,RT,, | Performed by: ORTHOPAEDIC SURGERY

## 2019-01-28 PROCEDURE — 99203 OFFICE O/P NEW LOW 30 MIN: CPT | Mod: 25,,, | Performed by: ORTHOPAEDIC SURGERY

## 2019-01-28 PROCEDURE — 20610 DRAIN/INJ JOINT/BURSA W/O US: CPT | Mod: RT,,, | Performed by: ORTHOPAEDIC SURGERY

## 2019-01-28 PROCEDURE — 99203 PR OFFICE/OUTPT VISIT, NEW, LEVL III, 30-44 MIN: ICD-10-PCS | Mod: 25,,, | Performed by: ORTHOPAEDIC SURGERY

## 2019-01-28 PROCEDURE — 73560 PR  X-RAY KNEE 1 OR 2 VIEW: ICD-10-PCS | Mod: FY,RT,, | Performed by: ORTHOPAEDIC SURGERY

## 2019-01-28 PROCEDURE — 20610 LARGE JOINT ASPIRATION/INJECTION: R KNEE: ICD-10-PCS | Mod: RT,,, | Performed by: ORTHOPAEDIC SURGERY

## 2019-01-28 RX ORDER — TRIAMCINOLONE ACETONIDE 40 MG/ML
40 INJECTION, SUSPENSION INTRA-ARTICULAR; INTRAMUSCULAR
Status: DISCONTINUED | OUTPATIENT
Start: 2019-01-28 | End: 2019-01-28 | Stop reason: HOSPADM

## 2019-01-28 RX ADMIN — TRIAMCINOLONE ACETONIDE 40 MG: 40 INJECTION, SUSPENSION INTRA-ARTICULAR; INTRAMUSCULAR at 01:01

## 2019-01-28 NOTE — LETTER
January 28, 2019      Shannon Ugarte MD  [address]  [address]  [place] [address]           [facility] - Orthopedic Surgery  [address]  [address]  [place] [address]  Phone: [phone]  Fax: [phone]          Patient: Ruth Roach   MR Number: [id number]   YOB: 1929   Date of Visit: 1/28/2019       Dear Dr. Shannon Ugarte:    Thank you for referring Ruth Roach to me for evaluation. Attached you will find relevant portions of my assessment and plan of care.    If you have questions, please do not hesitate to call me. I look forward to following Ruth Roach along with you.    Sincerely,    Isaiah Pacheco MD    Enclosure  CC:  No Recipients    If you would like to receive this communication electronically, please contact [email or web address] or [phone] to request more information on [employer] access.    For providers and/or their staff who would like to refer a patient to [facility], please contact us through our one-stop-shop provider referral line, [facility], at [phone].    If you feel you have received this communication in error or would no longer like to receive these types of communications, please e-mail [email or web address]

## 2019-01-28 NOTE — PROGRESS NOTES
Past Medical History:   Diagnosis Date    Arthritis     right hip and right knee    Atrial fibrillation     Complex renal cyst 7/19/2012    COPD (chronic obstructive pulmonary disease)     HTN (hypertension)     Hyperlipidemia     Narcolepsy without cataplexy(347.00)     Osteoarthritis of hip     Thyroid disease     Vitamin B12 deficiency     Got shots at 's office-last time was 6 months ago       Past Surgical History:   Procedure Laterality Date    APPENDECTOMY      pt claims with tubal pregnancy    CATARACT EXTRACTION      OU    ECTOPIC PREGNANCY SURGERY      EGD (ESOPHAGOGASTRODUODENOSCOPY) N/A 7/18/2012    [facility]O    EYE SURGERY      bilateral cataract    KNEE ARTHROSCOPY W/ MENISCAL REPAIR  2007    left knee    MASS EXCISION  age 15    left shoulder       Current Outpatient Medications   Medication Sig    albuterol-ipratropium (DUO-NEB) 2.5 mg-0.5 mg/3 mL nebulizer solution Inhale into the lungs.    ascorbic acid (VITAMIN C) 500 MG tablet Take 500 mg by mouth once daily.      fluticasone-vilanterol (BREO ELLIPTA) 200-25 mcg/dose DsDv diskus inhaler Inhale 1 puff into the lungs once daily.    ginseng 100 mg Cap Take 1 tablet by mouth daily as needed.      levothyroxine (SYNTHROID) 50 MCG tablet Take 1 tablet (50 mcg total) by mouth once daily.    loperamide (IMODIUM) 2 mg capsule Take 2 mg by mouth 4 (four) times daily as needed.      methylphenidate HCl (RITALIN) 20 MG tablet Take 1 tablet (20 mg total) by mouth 3 (three) times daily with meals.    multivitamin capsule Take 1 capsule by mouth once daily.      traMADol (ULTRAM) 50 mg tablet Take 1 tablet (50 mg total) by mouth every 12 (twelve) hours as needed for Pain.    azelastine (ASTELIN) 137 mcg (0.1 %) nasal spray 1 spray (137 mcg total) by Nasal route 2 (two) times daily.     No current facility-administered medications for this visit.        Review of patient's allergies indicates:  No  Known Allergies    Family History   Problem Relation Age of Onset    Stroke Mother     Heart disease Father     Hypertension Father     Cancer Father         leukemia    Alcohol abuse Brother     Arthritis Brother     Cataracts Brother     Diabetes Brother     Cancer Son         stomach cancer    Heart disease Son     Hypertension Son     Hyperlipidemia Son     Diabetes Son     Alcohol abuse Son     Arthritis Son         back    Hyperlipidemia Son     Alcohol abuse Son     COPD Son     Arthritis Son         back    Arthritis Son         shoulder    Alcohol abuse Son     Alcohol abuse Paternal Aunt     Amblyopia Neg Hx     Blindness Neg Hx     Glaucoma Neg Hx     Macular degeneration Neg Hx     Retinal detachment Neg Hx     Strabismus Neg Hx     Thyroid disease Neg Hx        Social History     Socioeconomic History    Marital status:      Spouse name: Not on file    Number of children: Not on file    Years of education: Not on file    Highest education level: Not on file   Social Needs    Financial resource strain: Not on file    Food insecurity - worry: Not on file    Food insecurity - inability: Not on file    Transportation needs - medical: Not on file    Transportation needs - non-medical: Not on file   Occupational History    Not on file   Tobacco Use    Smoking status: Former Smoker     Packs/day: 1.00     Years: 63.00     Pack years: 63.00    Smokeless tobacco: Never Used   Substance and Sexual Activity    Alcohol use: Yes     Comment: very rarely    Drug use: No    Sexual activity: No   Other Topics Concern    Not on file   Social History Narrative    Not on file       Chief Complaint:   Chief Complaint   Patient presents with    Right Knee - Pain, Follow-up     Effusion of bursa of right knee referred by Dr. Ugarte PCP. Last xrays were in 2016. Patient has 2-3 hx of right knee pain and swelling.        Consulting Physician: Shannon Ugarte,  "MD    History of Present Illness:    This is a 89 y.o. year old female who complains of Patient has about 2-3 year history of chronic right knee painshe also apparently has an effusion to her right knee      ROS    Examination:    Vital Signs:    Vitals:    01/28/19 1330   Weight: 57.6 kg (127 lb)   Height: 5' 5" (1.651 m)   PainSc:   4   PainLoc: Knee       This a well-developed, well nourished patient in no acute distress.    Alert and oriented and cooperative to examination.       Physical Exam: Right Knee Exam    Gait:   Patient in wheelchair    Skin  Rash:   None  Scars:   None    Inspection  Erythema:  None  Bruising:  None  Effusion:  mild  Masses:  None  Lymphadenopathy: None    Range of Motion: 0 to 110    Medial Joint : positive  Lateral Joint : None    Patellofemoral Tenderness: positive  Patellofemoral Crepitus: positive    Lachman:  Normal  Anterior Drawer: Normal  Posterior Drawer: Normal    Debra's:  Negative  Apley's:  Negative    Varus Stress:  Stable  Valgus Stress:  Stable    Strength:  5/5    Pulses:  Palpable distal    Sensation:  Intact          Imaging:  AP and lateral x-ray of the right knee show severe osteoarthritis with bone-on-bone contact medially     Assessment: severe osteoarthritis of right knee patient is wheelchair bound and not a good candidate for total knee replacement    Plan:  Will inject the right knee today with Kenalog      DISCLAIMER: This note may have been dictated using voice recognition software and may contain grammatical errors.     NOTE: Consult report sent to referring provider via EPIC EMR.  "

## 2019-01-28 NOTE — PROCEDURES
Large Joint Aspiration/Injection: R knee  Date/Time: 1/28/2019 1:59 PM  Performed by: Isaiah Pacheco MD  Authorized by: Isaiah Pacheco MD     Consent Done?:  Yes (Verbal)  Indications:  Pain and joint swelling  Procedure site marked: Yes    Timeout: Prior to procedure the correct patient, procedure, and site was verified      Location:  Knee  Site:  R knee  Prep: Patient was prepped and draped in usual sterile fashion    Needle size:  22 G  Approach:  Lateral  Medications:  40 mg triamcinolone acetonide 40 mg/mL  Patient tolerance:  Patient tolerated the procedure well with no immediate complications

## 2019-02-02 ENCOUNTER — OUTSIDE PLACE OF SERVICE (OUTPATIENT)
Dept: FAMILY MEDICINE | Facility: CLINIC | Age: 84
End: 2019-02-02
Payer: MEDICARE

## 2019-02-02 PROCEDURE — G0179 PR HOME HEALTH MD RECERTIFICATION: ICD-10-PCS | Mod: ,,, | Performed by: INTERNAL MEDICINE

## 2019-02-02 PROCEDURE — G0179 MD RECERTIFICATION HHA PT: HCPCS | Mod: ,,, | Performed by: INTERNAL MEDICINE

## 2019-02-07 ENCOUNTER — TELEPHONE (OUTPATIENT)
Dept: FAMILY MEDICINE | Facility: CLINIC | Age: 84
End: 2019-02-07

## 2019-02-20 ENCOUNTER — TELEPHONE (OUTPATIENT)
Dept: FAMILY MEDICINE | Facility: CLINIC | Age: 84
End: 2019-02-20

## 2019-02-20 DIAGNOSIS — R30.0 DYSURIA: ICD-10-CM

## 2019-02-20 DIAGNOSIS — E07.9 THYROID DISEASE: Primary | ICD-10-CM

## 2019-02-20 DIAGNOSIS — D53.9 MACROCYTIC ANEMIA: ICD-10-CM

## 2019-02-20 DIAGNOSIS — R73.9 HYPERGLYCEMIA: ICD-10-CM

## 2019-02-25 ENCOUNTER — TELEPHONE (OUTPATIENT)
Dept: FAMILY MEDICINE | Facility: CLINIC | Age: 84
End: 2019-02-25

## 2019-02-25 NOTE — TELEPHONE ENCOUNTER
Spoke with Jumana Roach. States pt is not taking the Ritalin TID anymore. Pt is taking once daily.  Wanted to know if that is ok. If it is please change in the chart to reflect one time  Daily.  Pt was taking for Narcolepsy but pt is now 89 yo, doesn't drive anymore, so feels no need for her to take TID.

## 2019-02-26 NOTE — TELEPHONE ENCOUNTER
Okay no problem we will change on the chart. Would keep the prescription at 90 however so she does not have to come every month to  the medication. Also asked Mrs. Denney if they got the lab work and urinalysis through home health, likely to be done this week

## 2019-03-01 LAB
ALBUMIN SERPL-MCNC: 3.6 G/DL (ref 3.1–4.7)
ALP SERPL-CCNC: 106 IU/L (ref 40–104)
ALT (SGPT): 16 IU/L (ref 3–33)
AST SERPL-CCNC: 23 IU/L (ref 10–40)
BASOPHILS NFR BLD: 0 K/UL (ref 0–0.2)
BASOPHILS NFR BLD: 0.5 %
BILIRUB SERPL-MCNC: 0.5 MG/DL (ref 0.3–1)
BUN SERPL-MCNC: 35 MG/DL (ref 8–20)
CALCIUM SERPL-MCNC: 9.5 MG/DL (ref 7.7–10.4)
CHLORIDE: 99 MMOL/L (ref 98–110)
CO2 SERPL-SCNC: 36.4 MMOL/L (ref 22.8–31.6)
CREATININE: 0.54 MG/DL (ref 0.6–1.4)
EOSINOPHIL NFR BLD: 0.2 K/UL (ref 0–0.7)
EOSINOPHIL NFR BLD: 3.7 %
ERYTHROCYTE [DISTWIDTH] IN BLOOD BY AUTOMATED COUNT: 13.2 % (ref 11.7–14.9)
GLUCOSE: 104 MG/DL (ref 70–99)
GRAN #: 3.6 K/UL (ref 1.4–6.5)
GRAN%: 57.9 %
HCT VFR BLD AUTO: 41.6 % (ref 36–48)
HGB BLD-MCNC: 13.3 G/DL (ref 12–15)
IMMATURE GRANS (ABS): 0 K/UL (ref 0–1)
IMMATURE GRANULOCYTES: 0.5 %
LYMPH #: 1.7 K/UL (ref 1.2–3.4)
LYMPH%: 27.6 %
MCH RBC QN AUTO: 33.3 PG (ref 25–35)
MCHC RBC AUTO-ENTMCNC: 32 G/DL (ref 31–36)
MCV RBC AUTO: 104.3 FL (ref 79–98)
MONO #: 0.6 K/UL (ref 0.1–0.6)
MONO%: 9.8 %
NUCLEATED RBCS: 0 %
PLATELET # BLD AUTO: 273 K/UL (ref 140–440)
PMV BLD AUTO: 11.3 FL (ref 8.8–12.7)
POTASSIUM SERPL-SCNC: 4.7 MMOL/L (ref 3.5–5)
PROT SERPL-MCNC: 7 G/DL (ref 6–8.2)
RBC # BLD AUTO: 3.99 M/UL (ref 3.5–5.5)
SODIUM: 142 MMOL/L (ref 134–144)
T4 FREE SP9 P CHAL SERPL-SCNC: 0.94 NG/DL (ref 0.45–1.27)
TSH SERPL DL<=0.005 MIU/L-ACNC: 2.91 ULU/ML (ref 0.3–5.6)
WBC # BLD AUTO: 6.2 K/UL (ref 5–10)

## 2019-03-21 ENCOUNTER — TELEPHONE (OUTPATIENT)
Dept: FAMILY MEDICINE | Facility: CLINIC | Age: 84
End: 2019-03-21

## 2019-03-21 NOTE — TELEPHONE ENCOUNTER
BETO ESPARZA ASKING FOR COPY OF PT'S DNR AND LIVING WILL. I DO NOT SEE EITHER SCANNED TO MEDIA. HOW CAN SHE GET A COPY? PLEASE ADVISE

## 2019-03-23 NOTE — TELEPHONE ENCOUNTER
Call ms Denney back and see if she is aware of Mrs Benjamin dropping off a living will?  Also talk with natalia  I don't recall every receiving one in the three years I have been practicing with [facility]

## 2019-07-28 ENCOUNTER — HOSPITAL ENCOUNTER (INPATIENT)
Facility: HOSPITAL | Age: 84
LOS: 1 days | Discharge: HOSPICE | DRG: 690 | End: 2019-08-02
Attending: EMERGENCY MEDICINE | Admitting: INTERNAL MEDICINE
Payer: MEDICARE
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
## 2019-07-28 DIAGNOSIS — R07.9 CHEST PAIN: ICD-10-CM

## 2019-07-28 DIAGNOSIS — N30.90 CYSTITIS: ICD-10-CM

## 2019-07-28 DIAGNOSIS — T14.90XA TRAUMA: ICD-10-CM

## 2019-07-28 PROBLEM — N30.00 ACUTE CYSTITIS: Status: ACTIVE | Noted: 2019-07-28

## 2019-07-28 PROBLEM — R41.0 DELIRIUM: Status: ACTIVE | Noted: 2019-07-28

## 2019-07-28 LAB
ABO GROUP BLD: NORMAL
ALBUMIN SERPL BCP-MCNC: 3.3 G/DL (ref 3.5–5.2)
ALP SERPL-CCNC: 97 U/L (ref 55–135)
ALT SERPL W/O P-5'-P-CCNC: 28 U/L (ref 10–44)
AMYLASE SERPL-CCNC: 22 U/L (ref 20–110)
ANION GAP SERPL CALC-SCNC: 4 MMOL/L (ref 8–16)
APTT PPP: 30.1 SEC (ref 26.2–34.7)
AST SERPL-CCNC: 69 U/L (ref 10–40)
BACTERIA #/AREA URNS HPF: ABNORMAL /HPF
BASOPHILS # BLD AUTO: 0.03 K/UL (ref 0–0.2)
BASOPHILS # BLD AUTO: 0.03 K/UL (ref 0–0.2)
BASOPHILS NFR BLD: 0.3 % (ref 0–1.9)
BASOPHILS NFR BLD: 0.3 % (ref 0–1.9)
BILIRUB SERPL-MCNC: 1.2 MG/DL (ref 0.1–1)
BILIRUB UR QL STRIP: NEGATIVE
BLD GP AB SCN CELLS X3 SERPL QL: NORMAL
BNP SERPL-MCNC: 91 PG/ML (ref 0–99)
BNP SERPL-MCNC: 91 PG/ML (ref 0–99)
BUN SERPL-MCNC: 31 MG/DL (ref 8–23)
CALCIUM SERPL-MCNC: 8.7 MG/DL (ref 8.7–10.5)
CHLORIDE SERPL-SCNC: 105 MMOL/L (ref 95–110)
CLARITY UR: ABNORMAL
CO2 SERPL-SCNC: 34 MMOL/L (ref 23–29)
COLOR UR: YELLOW
CREAT SERPL-MCNC: 0.7 MG/DL (ref 0.5–1.4)
DIFFERENTIAL METHOD: ABNORMAL
DIFFERENTIAL METHOD: ABNORMAL
EOSINOPHIL # BLD AUTO: 0 K/UL (ref 0–0.5)
EOSINOPHIL # BLD AUTO: 0 K/UL (ref 0–0.5)
EOSINOPHIL NFR BLD: 0.2 % (ref 0–8)
EOSINOPHIL NFR BLD: 0.2 % (ref 0–8)
ERYTHROCYTE [DISTWIDTH] IN BLOOD BY AUTOMATED COUNT: 13 % (ref 11.5–14.5)
ERYTHROCYTE [DISTWIDTH] IN BLOOD BY AUTOMATED COUNT: 13 % (ref 11.5–14.5)
EST. GFR  (AFRICAN AMERICAN): >60 ML/MIN/1.73 M^2
EST. GFR  (NON AFRICAN AMERICAN): >60 ML/MIN/1.73 M^2
GLUCOSE SERPL-MCNC: 123 MG/DL (ref 70–110)
GLUCOSE UR QL STRIP: NEGATIVE
HCT VFR BLD AUTO: 43 % (ref 37–48.5)
HCT VFR BLD AUTO: 43 % (ref 37–48.5)
HGB BLD-MCNC: 13.7 G/DL (ref 12–16)
HGB BLD-MCNC: 13.7 G/DL (ref 12–16)
HGB UR QL STRIP: NEGATIVE
HYALINE CASTS #/AREA URNS LPF: 32 /LPF
IMM GRANULOCYTES # BLD AUTO: 0.04 K/UL (ref 0–0.04)
IMM GRANULOCYTES # BLD AUTO: 0.04 K/UL (ref 0–0.04)
IMM GRANULOCYTES NFR BLD AUTO: 0.4 % (ref 0–0.5)
IMM GRANULOCYTES NFR BLD AUTO: 0.4 % (ref 0–0.5)
INFLUENZA A, MOLECULAR: NEGATIVE
INFLUENZA B, MOLECULAR: NEGATIVE
INR PPP: 1.1
KETONES UR QL STRIP: ABNORMAL
LACTATE SERPL-SCNC: 1.6 MMOL/L (ref 0.5–1.9)
LEUKOCYTE ESTERASE UR QL STRIP: NEGATIVE
LIPASE SERPL-CCNC: 27 U/L (ref 4–60)
LYMPHOCYTES # BLD AUTO: 0.9 K/UL (ref 1–4.8)
LYMPHOCYTES # BLD AUTO: 0.9 K/UL (ref 1–4.8)
LYMPHOCYTES NFR BLD: 8 % (ref 18–48)
LYMPHOCYTES NFR BLD: 8 % (ref 18–48)
MAGNESIUM SERPL-MCNC: 1.8 MG/DL (ref 1.6–2.6)
MCH RBC QN AUTO: 33.2 PG (ref 27–31)
MCH RBC QN AUTO: 33.2 PG (ref 27–31)
MCHC RBC AUTO-ENTMCNC: 31.9 G/DL (ref 32–36)
MCHC RBC AUTO-ENTMCNC: 31.9 G/DL (ref 32–36)
MCV RBC AUTO: 104 FL (ref 82–98)
MCV RBC AUTO: 104 FL (ref 82–98)
MICROSCOPIC COMMENT: ABNORMAL
MONOCYTES # BLD AUTO: 1.2 K/UL (ref 0.3–1)
MONOCYTES # BLD AUTO: 1.2 K/UL (ref 0.3–1)
MONOCYTES NFR BLD: 10.6 % (ref 4–15)
MONOCYTES NFR BLD: 10.6 % (ref 4–15)
NEUTROPHILS # BLD AUTO: 8.8 K/UL (ref 1.8–7.7)
NEUTROPHILS # BLD AUTO: 8.8 K/UL (ref 1.8–7.7)
NEUTROPHILS NFR BLD: 80.5 % (ref 38–73)
NEUTROPHILS NFR BLD: 80.5 % (ref 38–73)
NITRITE UR QL STRIP: NEGATIVE
NRBC BLD-RTO: 0 /100 WBC
NRBC BLD-RTO: 0 /100 WBC
PH UR STRIP: 6 [PH] (ref 5–8)
PLATELET # BLD AUTO: 291 K/UL (ref 150–350)
PLATELET # BLD AUTO: 291 K/UL (ref 150–350)
PMV BLD AUTO: 11.1 FL (ref 9.2–12.9)
PMV BLD AUTO: 11.1 FL (ref 9.2–12.9)
POTASSIUM SERPL-SCNC: 3.3 MMOL/L (ref 3.5–5.1)
PROT SERPL-MCNC: 6.6 G/DL (ref 6–8.4)
PROT UR QL STRIP: ABNORMAL
PROTHROMBIN TIME: 13.7 SEC (ref 11.7–14)
RBC # BLD AUTO: 4.13 M/UL (ref 4–5.4)
RBC # BLD AUTO: 4.13 M/UL (ref 4–5.4)
RBC #/AREA URNS HPF: 1 /HPF (ref 0–4)
RH BLD: NORMAL
SODIUM SERPL-SCNC: 143 MMOL/L (ref 136–145)
SP GR UR STRIP: 1.03 (ref 1–1.03)
SPECIMEN SOURCE: NORMAL
SQUAMOUS #/AREA URNS HPF: 4 /HPF
TROPONIN I SERPL DL<=0.01 NG/ML-MCNC: 0.03 NG/ML (ref 0.02–0.5)
TSH SERPL DL<=0.005 MIU/L-ACNC: 1.43 UIU/ML (ref 0.34–5.6)
URN SPEC COLLECT METH UR: ABNORMAL
UROBILINOGEN UR STRIP-ACNC: NEGATIVE EU/DL
WBC # BLD AUTO: 10.91 K/UL (ref 3.9–12.7)
WBC # BLD AUTO: 10.91 K/UL (ref 3.9–12.7)
WBC #/AREA URNS HPF: 3 /HPF (ref 0–5)

## 2019-07-28 PROCEDURE — 83735 ASSAY OF MAGNESIUM: CPT

## 2019-07-28 PROCEDURE — 83690 ASSAY OF LIPASE: CPT

## 2019-07-28 PROCEDURE — 96366 THER/PROPH/DIAG IV INF ADDON: CPT

## 2019-07-28 PROCEDURE — 27000221 HC OXYGEN, UP TO 24 HOURS

## 2019-07-28 PROCEDURE — 80053 COMPREHEN METABOLIC PANEL: CPT

## 2019-07-28 PROCEDURE — G0378 HOSPITAL OBSERVATION PER HR: HCPCS

## 2019-07-28 PROCEDURE — 81001 URINALYSIS AUTO W/SCOPE: CPT

## 2019-07-28 PROCEDURE — 25000003 PHARM REV CODE 250: Performed by: EMERGENCY MEDICINE

## 2019-07-28 PROCEDURE — 96365 THER/PROPH/DIAG IV INF INIT: CPT

## 2019-07-28 PROCEDURE — 83605 ASSAY OF LACTIC ACID: CPT

## 2019-07-28 PROCEDURE — 63600175 PHARM REV CODE 636 W HCPCS: Performed by: EMERGENCY MEDICINE

## 2019-07-28 PROCEDURE — 84443 ASSAY THYROID STIM HORMONE: CPT

## 2019-07-28 PROCEDURE — 25000003 PHARM REV CODE 250: Performed by: NURSE PRACTITIONER

## 2019-07-28 PROCEDURE — 87502 INFLUENZA DNA AMP PROBE: CPT

## 2019-07-28 PROCEDURE — 85730 THROMBOPLASTIN TIME PARTIAL: CPT

## 2019-07-28 PROCEDURE — 82150 ASSAY OF AMYLASE: CPT

## 2019-07-28 PROCEDURE — 84484 ASSAY OF TROPONIN QUANT: CPT

## 2019-07-28 PROCEDURE — 86901 BLOOD TYPING SEROLOGIC RH(D): CPT

## 2019-07-28 PROCEDURE — 25500020 PHARM REV CODE 255: Performed by: EMERGENCY MEDICINE

## 2019-07-28 PROCEDURE — 86850 RBC ANTIBODY SCREEN: CPT

## 2019-07-28 PROCEDURE — 87040 BLOOD CULTURE FOR BACTERIA: CPT | Mod: 59

## 2019-07-28 PROCEDURE — 85025 COMPLETE CBC W/AUTO DIFF WBC: CPT

## 2019-07-28 PROCEDURE — 51702 INSERT TEMP BLADDER CATH: CPT

## 2019-07-28 PROCEDURE — 85610 PROTHROMBIN TIME: CPT

## 2019-07-28 PROCEDURE — 99285 EMERGENCY DEPT VISIT HI MDM: CPT | Mod: 25

## 2019-07-28 PROCEDURE — 83880 ASSAY OF NATRIURETIC PEPTIDE: CPT

## 2019-07-28 PROCEDURE — 93005 ELECTROCARDIOGRAM TRACING: CPT

## 2019-07-28 PROCEDURE — 87086 URINE CULTURE/COLONY COUNT: CPT

## 2019-07-28 RX ORDER — POTASSIUM CHLORIDE 20 MEQ/1
40 TABLET, EXTENDED RELEASE ORAL
Status: COMPLETED | OUTPATIENT
Start: 2019-07-28 | End: 2019-07-28

## 2019-07-28 RX ORDER — LOPERAMIDE HYDROCHLORIDE 2 MG/1
2 CAPSULE ORAL ONCE AS NEEDED
Status: DISCONTINUED | OUTPATIENT
Start: 2019-07-28 | End: 2019-08-02 | Stop reason: HOSPADM

## 2019-07-28 RX ORDER — AZELASTINE 1 MG/ML
1 SPRAY, METERED NASAL 2 TIMES DAILY
Status: DISCONTINUED | OUTPATIENT
Start: 2019-07-28 | End: 2019-08-02 | Stop reason: HOSPADM

## 2019-07-28 RX ORDER — ACETAMINOPHEN 500 MG
1000 TABLET ORAL
Status: COMPLETED | OUTPATIENT
Start: 2019-07-28 | End: 2019-07-28

## 2019-07-28 RX ORDER — ASCORBIC ACID 500 MG
500 TABLET ORAL DAILY
Status: DISCONTINUED | OUTPATIENT
Start: 2019-07-29 | End: 2019-08-02 | Stop reason: HOSPADM

## 2019-07-28 RX ORDER — LEVOTHYROXINE SODIUM 25 UG/1
50 TABLET ORAL
Status: DISCONTINUED | OUTPATIENT
Start: 2019-07-29 | End: 2019-08-02 | Stop reason: HOSPADM

## 2019-07-28 RX ORDER — SODIUM CHLORIDE 9 MG/ML
1000 INJECTION, SOLUTION INTRAVENOUS
Status: COMPLETED | OUTPATIENT
Start: 2019-07-28 | End: 2019-07-28

## 2019-07-28 RX ORDER — ONDANSETRON 2 MG/ML
4 INJECTION INTRAMUSCULAR; INTRAVENOUS EVERY 8 HOURS PRN
Status: DISCONTINUED | OUTPATIENT
Start: 2019-07-28 | End: 2019-08-02 | Stop reason: HOSPADM

## 2019-07-28 RX ORDER — SODIUM CHLORIDE 0.9 % (FLUSH) 0.9 %
10 SYRINGE (ML) INJECTION
Status: DISCONTINUED | OUTPATIENT
Start: 2019-07-28 | End: 2019-08-02 | Stop reason: HOSPADM

## 2019-07-28 RX ORDER — ACETAMINOPHEN 325 MG/1
650 TABLET ORAL EVERY 8 HOURS PRN
Status: DISCONTINUED | OUTPATIENT
Start: 2019-07-28 | End: 2019-08-02 | Stop reason: HOSPADM

## 2019-07-28 RX ORDER — METHYLPHENIDATE HYDROCHLORIDE 5 MG/1
20 TABLET ORAL
Status: DISCONTINUED | OUTPATIENT
Start: 2019-07-29 | End: 2019-08-02 | Stop reason: HOSPADM

## 2019-07-28 RX ORDER — IPRATROPIUM BROMIDE AND ALBUTEROL SULFATE 2.5; .5 MG/3ML; MG/3ML
3 SOLUTION RESPIRATORY (INHALATION) EVERY 6 HOURS
Status: DISCONTINUED | OUTPATIENT
Start: 2019-07-29 | End: 2019-07-29

## 2019-07-28 RX ORDER — TRAMADOL HYDROCHLORIDE 50 MG/1
50 TABLET ORAL EVERY 12 HOURS PRN
Status: DISCONTINUED | OUTPATIENT
Start: 2019-07-28 | End: 2019-08-02 | Stop reason: HOSPADM

## 2019-07-28 RX ORDER — PANTOPRAZOLE SODIUM 40 MG/1
40 TABLET, DELAYED RELEASE ORAL DAILY
Status: DISCONTINUED | OUTPATIENT
Start: 2019-07-29 | End: 2019-08-02 | Stop reason: HOSPADM

## 2019-07-28 RX ORDER — FLUTICASONE FUROATE AND VILANTEROL 200; 25 UG/1; UG/1
1 POWDER RESPIRATORY (INHALATION) DAILY
Status: DISCONTINUED | OUTPATIENT
Start: 2019-07-29 | End: 2019-08-02 | Stop reason: HOSPADM

## 2019-07-28 RX ORDER — ACETAMINOPHEN 500 MG
1000 TABLET ORAL EVERY 6 HOURS PRN
Status: DISCONTINUED | OUTPATIENT
Start: 2019-07-28 | End: 2019-07-28

## 2019-07-28 RX ADMIN — POTASSIUM CHLORIDE 40 MEQ: 1500 TABLET, EXTENDED RELEASE ORAL at 10:07

## 2019-07-28 RX ADMIN — IOHEXOL 100 ML: 350 INJECTION, SOLUTION INTRAVENOUS at 05:07

## 2019-07-28 RX ADMIN — ACETAMINOPHEN 1000 MG: 500 TABLET, FILM COATED ORAL at 02:07

## 2019-07-28 RX ADMIN — SODIUM CHLORIDE 1000 ML: 0.9 INJECTION, SOLUTION INTRAVENOUS at 03:07

## 2019-07-28 RX ADMIN — CEFTRIAXONE SODIUM 2 G: 2 INJECTION, SOLUTION INTRAVENOUS at 02:07

## 2019-07-28 NOTE — ED TRIAGE NOTES
EMS reports family stated that pt had a fall yesterday and had no complaints but slept much longer than usual and woke up with left hip pain and more confused than usual.

## 2019-07-28 NOTE — ED PROVIDER NOTES
Encounter Date: 7/28/2019       History     Chief Complaint   Patient presents with    Hip Pain     This is a 89-year-old female who presents with a report of a decreased level of consciousness.  The patient was found on the ground by her brother with whom she lives when he awoke yesterday morning.  History is currently being given by the patient's son who was not there at the time.  The son reports that a different son came over and helped place the patient back in her bed.  She apparently was awake alert and oriented. She is not complaining of any specific pain. However since that time she has been sleeping excessively and has been more confused than normal but does have a history of dementia.  Patient was found to have fever in the emergency room and the family were not aware that she had fever.  She denies any headache or neck pain.  She denies chest pain or shortness of breath. She has not been coughing or having any respiratory problems.  She has had urinary tract infections in the past.  She denies abdominal pain vomiting or diarrhea.  Since the fall she states she has had right hip and right knee pain. She has COPD and is on oxygen at home.  Family have not noticed any increase in chronic shortness of breath and have not noticed her to be coughing.  The family denies any other problems or complaints.        Review of patient's allergies indicates:  No Known Allergies  Past Medical History:   Diagnosis Date    Arthritis     right hip and right knee    Atrial fibrillation     Complex renal cyst 7/19/2012    COPD (chronic obstructive pulmonary disease)     HTN (hypertension)     Hyperlipidemia     Narcolepsy without cataplexy(347.00)     Osteoarthritis of hip     Thyroid disease     Vitamin B12 deficiency     Got shots at 's office-last time was 6 months ago     Past Surgical History:   Procedure Laterality Date    APPENDECTOMY      pt claims with tubal pregnancy    CATARACT EXTRACTION      OU     ECTOPIC PREGNANCY SURGERY      EGD (ESOPHAGOGASTRODUODENOSCOPY) N/A 7/18/2012    Performed by [facility] at [facility] ENDO    EYE SURGERY      bilateral cataract    KNEE ARTHROSCOPY W/ MENISCAL REPAIR  2007    left knee    MASS EXCISION  age 15    left shoulder     Family History   Problem Relation Age of Onset    Stroke Mother     Heart disease Father     Hypertension Father     Cancer Father         leukemia    Alcohol abuse Brother     Arthritis Brother     Cataracts Brother     Diabetes Brother     Cancer Son         stomach cancer    Heart disease Son     Hypertension Son     Hyperlipidemia Son     Diabetes Son     Alcohol abuse Son     Arthritis Son         back    Hyperlipidemia Son     Alcohol abuse Son     COPD Son     Arthritis Son         back    Arthritis Son         shoulder    Alcohol abuse Son     Alcohol abuse Paternal Aunt     Amblyopia Neg Hx     Blindness Neg Hx     Glaucoma Neg Hx     Macular degeneration Neg Hx     Retinal detachment Neg Hx     Strabismus Neg Hx     Thyroid disease Neg Hx      Social History     Tobacco Use    Smoking status: Former Smoker     Packs/day: 1.00     Years: 63.00     Pack years: 63.00    Smokeless tobacco: Never Used   Substance Use Topics    Alcohol use: Yes     Comment: very rarely    Drug use: No     Review of Systems   Constitutional: Positive for activity change and appetite change. Negative for chills, fatigue and fever.   HENT: Negative for congestion, dental problem, ear pain, rhinorrhea, sinus pressure, sinus pain, sore throat and trouble swallowing.    Eyes: Negative for photophobia, pain, redness and visual disturbance.   Respiratory: Negative for cough, chest tightness, shortness of breath and wheezing.    Cardiovascular: Negative for chest pain, palpitations and leg swelling.   Gastrointestinal: Negative for abdominal distention, abdominal pain, anal bleeding, blood in stool, constipation, diarrhea, nausea  and vomiting.   Genitourinary: Negative for decreased urine volume, difficulty urinating, dysuria, flank pain, frequency, hematuria, pelvic pain and urgency.   Musculoskeletal: Positive for joint swelling (Swelling to the right knee) and myalgias (Patient complaining of pain to the right hip and right knee after the fall but not prior.  Denies any other myalgias or arthralgias.). Negative for arthralgias, back pain, gait problem, neck pain and neck stiffness.   Skin: Negative for pallor and rash.   Neurological: Positive for weakness (Reports generalized nonfocal weakness). Negative for dizziness, tremors, seizures, speech difficulty, light-headedness, numbness and headaches.   Hematological: Does not bruise/bleed easily.   Psychiatric/Behavioral: Positive for confusion.       Physical Exam     Initial Vitals [07/28/19 1257]   BP Pulse Resp Temp SpO2   137/64 80 (!) 24 99 °F (37.2 °C) (!) 94 %      MAP       --         Physical Exam    Nursing note and vitals reviewed.  Constitutional: She is cooperative.  Non-toxic appearance. She does not appear ill.   HENT:   Head: Normocephalic.   Right Ear: Tympanic membrane normal.   Left Ear: Tympanic membrane normal.   Nose: Nose normal.   Mouth/Throat: Uvula is midline. Mucous membranes are dry. No posterior oropharyngeal edema or posterior oropharyngeal erythema.   Eyes: Conjunctivae, EOM and lids are normal. Pupils are equal, round, and reactive to light. No scleral icterus.   Neck: Trachea normal, normal range of motion, full passive range of motion without pain and phonation normal. Neck supple. No thyroid mass present. No spinous process tenderness present. Normal range of motion present. No neck rigidity. No JVD present.   Cardiovascular: Normal rate, regular rhythm, normal heart sounds, intact distal pulses and normal pulses.   No murmur heard.  Pulmonary/Chest: Effort normal and breath sounds normal. No accessory muscle usage. No tachypnea. No respiratory distress.    Abdominal: Soft. Normal appearance and bowel sounds are normal. She exhibits no distension, no pulsatile midline mass and no mass. There is no tenderness. There is no rigidity, no guarding and no CVA tenderness.   Musculoskeletal:        Right hip: She exhibits decreased range of motion. She exhibits normal strength, no bony tenderness, no swelling and no deformity.        Right knee: She exhibits decreased range of motion, swelling, effusion and bony tenderness (Bony tenderness noted to the anterior right knee.  Diffusely.). She exhibits no deformity, no laceration, no erythema, normal alignment and no LCL laxity. Tenderness found.   Neurological: She is alert. She has normal strength and normal reflexes. She is disoriented. No cranial nerve deficit or sensory deficit.   Skin: Skin is intact. Capillary refill takes less than 2 seconds. No ecchymosis and no rash noted. No erythema. No pallor.   Psychiatric: She has a normal mood and affect. Her speech is normal and behavior is normal. Cognition and memory are impaired. She expresses inappropriate judgment. She is inattentive.         ED Course   Procedures  Labs Reviewed   CULTURE, BLOOD    Narrative:     RW   CULTURE, BLOOD   CULTURE, BLOOD   CBC W/ AUTO DIFFERENTIAL   COMPREHENSIVE METABOLIC PANEL   TROPONIN I   B-TYPE NATRIURETIC PEPTIDE   APTT   AMYLASE   B-TYPE NATRIURETIC PEPTIDE   CBC W/ AUTO DIFFERENTIAL   LACTIC ACID, PLASMA   LIPASE   MAGNESIUM   PROTIME-INR   INFLUENZA A AND B ANTIGEN   TSH   URINALYSIS, REFLEX TO URINE CULTURE   TYPE & SCREEN   POCT LACTATE        ECG Results          EKG 12-lead (In process)  Result time 07/28/19 13:39:53    In process by Interface, Lab In [facility] (07/28/19 13:39:53)                 Narrative:    Test Reason : W19.XXXA,    Vent. Rate : 109 BPM     Atrial Rate : 125 BPM     P-R Int : 170 ms          QRS Dur : 076 ms      QT Int : 350 ms       P-R-T Axes : 090 -72 061 degrees     QTc Int : 471 ms    Sinus tachycardia  with Premature atrial complexes  Left axis deviation  Inferior infarct ,age undetermined  Anterior infarct ,age undetermined  Abnormal ECG  When compared with ECG of 14-JUL-2012 16:55,  Significant changes have occurred    Referred By: [email or web address]   SELF           Confirmed By:                             Imaging Results          X-Ray Knee 1 or 2 View Right (Final result)  Result time 07/28/19 14:42:13    Final result by Eugene Akbar Jr., MD (07/28/19 14:42:13)                 Impression:      Present examination shows no evidence of acute traumatic injury.  There is evidence of mild preferential narrowing affecting the medial tibiofemoral compartment on a chronic basis.      Electronically signed by: Eugene Akbar MD  Date:    07/28/2019  Time:    14:42             Narrative:    EXAMINATION:  XR KNEE 1 OR 2 VIEW RIGHT    CLINICAL HISTORY:  trauma/pain;    TECHNIQUE:  AP and lateral views of the right knee    COMPARISON:  No previous comparison study.    FINDINGS:  The AP projection is evidence of fairly profound narrowing affecting the medial tibiofemoral compartment results in varus angulation distal extremity region with evidence of moderate overlying sclerosis.  Minimal osteophytic spur formation arises from the medial condylar surface.  Faint calculus, atelectasis is identified in the lateral tibiofemoral compartment.  No evidence of significant joint effusion.  There is minimal reactive spur formation along the apex of the dorsal patella.  Small early enthesophyte formation at the quadriceps tendon insertion margin.                               X-Ray Femur 2 AP/LAT Right (Final result)  Result time 07/28/19 14:37:37    Final result by Eugene Akbar Jr., MD (07/28/19 14:37:37)                 Impression:      Present examination shows no evidence of acute traumatic injury changes.  The articular joint space of the knee shows evidence of preferential narrowing affecting the medial tibiofemoral  compartment.      Electronically signed by: Eugene Akbar MD  Date:    07/28/2019  Time:    14:37             Narrative:    EXAMINATION:  XR FEMUR 2 VIEW RIGHT    CLINICAL HISTORY:  Injury, unspecified, initial encounter    TECHNIQUE:  AP and lateral views of the right femur were performed.    COMPARISON:  No previous comparison study is made available.    FINDINGS:  Alignment along shaft of the femur appears well maintained demonstrating no evidence of an acute fracture deformity or bony destructive changes.  No significant periosteal reaction is identified.  The articular joint space of the knee shows evidence of preferential narrowing affecting the medial tibiofemoral compartment with evidence of varus angulation of the distal extremity region.  Faint chondrocalcinosis across the lateral tibiofemoral compartment is suspected.                               X-Ray Chest AP Portable (Final result)  Result time 07/28/19 14:39:51    Final result by Eugene Akbar Jr., MD (07/28/19 14:39:51)                 Impression:      Stable appearance of the chest without evidence of any relevant change as compared to previous.  Old granulomatous reaction changes are noted.      Electronically signed by: Eugene Akbar MD  Date:    07/28/2019  Time:    14:39             Narrative:    EXAMINATION:  XR CHEST AP PORTABLE    CLINICAL HISTORY:  Chest Pain;    TECHNIQUE:  Single frontal view of the chest was performed.    COMPARISON:  Comparison study is 03/20/2017.    FINDINGS:  The cardiomediastinal silhouette appears normal in size.  There is no significant widening involving the upper mediastinum.  Right paratracheal shadow is prominent attributed towards vessel tortuosity.  There is evidence of atheromatous calcification about the aortic knob.  Emphysematous changes as there is evidence of diminished vascularity about the cortex of the chest with evidence of mild perihilar scarring changes.  There is granulomatous reaction  about the right paratracheal margin.  Elevated right hemidiaphragm on a chronic basis.  Minimal pulmonary scar formation along the left diaphragmatic surface is noted.  Chronic skeletal changes and minimal dextroconvex alignment the thoracic spine is noted.                                                      Clinical Impression:       ICD-10-CM ICD-9-CM   1. Fall W19.XXXA E888.9   2. Chest pain R07.9 786.50   3. Trauma T14.90XA 959.9   4. Cystitis N30.90 595.9                                Raven Chew MD  09/05/19 9472

## 2019-07-29 LAB
ALBUMIN SERPL BCP-MCNC: 3 G/DL (ref 3.5–5.2)
ALP SERPL-CCNC: 84 U/L (ref 55–135)
ALT SERPL W/O P-5'-P-CCNC: 25 U/L (ref 10–44)
ANION GAP SERPL CALC-SCNC: 4 MMOL/L (ref 8–16)
AST SERPL-CCNC: 51 U/L (ref 10–40)
BASOPHILS # BLD AUTO: 0.04 K/UL (ref 0–0.2)
BASOPHILS NFR BLD: 0.4 % (ref 0–1.9)
BILIRUB SERPL-MCNC: 1 MG/DL (ref 0.1–1)
BUN SERPL-MCNC: 21 MG/DL (ref 8–23)
CALCIUM SERPL-MCNC: 8.5 MG/DL (ref 8.7–10.5)
CHLORIDE SERPL-SCNC: 105 MMOL/L (ref 95–110)
CK MB SERPL-MCNC: 7.2 NG/ML (ref 0.1–6.5)
CK MB SERPL-MCNC: 7.4 NG/ML (ref 0.1–6.5)
CK MB SERPL-MCNC: 8 NG/ML (ref 0.1–6.5)
CK SERPL-CCNC: 424 U/L (ref 20–180)
CK SERPL-CCNC: 488 U/L (ref 20–180)
CK SERPL-CCNC: 504 U/L (ref 20–180)
CO2 SERPL-SCNC: 31 MMOL/L (ref 23–29)
CREAT SERPL-MCNC: 0.6 MG/DL (ref 0.5–1.4)
DIFFERENTIAL METHOD: ABNORMAL
EOSINOPHIL # BLD AUTO: 0.2 K/UL (ref 0–0.5)
EOSINOPHIL NFR BLD: 2.2 % (ref 0–8)
ERYTHROCYTE [DISTWIDTH] IN BLOOD BY AUTOMATED COUNT: 13.1 % (ref 11.5–14.5)
EST. GFR  (AFRICAN AMERICAN): >60 ML/MIN/1.73 M^2
EST. GFR  (NON AFRICAN AMERICAN): >60 ML/MIN/1.73 M^2
GLUCOSE SERPL-MCNC: 90 MG/DL (ref 70–110)
HCT VFR BLD AUTO: 39.7 % (ref 37–48.5)
HGB BLD-MCNC: 12.6 G/DL (ref 12–16)
IMM GRANULOCYTES # BLD AUTO: 0.02 K/UL (ref 0–0.04)
IMM GRANULOCYTES NFR BLD AUTO: 0.2 % (ref 0–0.5)
LYMPHOCYTES # BLD AUTO: 1.4 K/UL (ref 1–4.8)
LYMPHOCYTES NFR BLD: 15 % (ref 18–48)
MCH RBC QN AUTO: 33.1 PG (ref 27–31)
MCHC RBC AUTO-ENTMCNC: 31.7 G/DL (ref 32–36)
MCV RBC AUTO: 104 FL (ref 82–98)
MONOCYTES # BLD AUTO: 1.1 K/UL (ref 0.3–1)
MONOCYTES NFR BLD: 12.1 % (ref 4–15)
NEUTROPHILS # BLD AUTO: 6.3 K/UL (ref 1.8–7.7)
NEUTROPHILS NFR BLD: 70.1 % (ref 38–73)
NRBC BLD-RTO: 0 /100 WBC
PLATELET # BLD AUTO: 257 K/UL (ref 150–350)
PMV BLD AUTO: 11.4 FL (ref 9.2–12.9)
POTASSIUM SERPL-SCNC: 3.6 MMOL/L (ref 3.5–5.1)
PROT SERPL-MCNC: 5.8 G/DL (ref 6–8.4)
RBC # BLD AUTO: 3.81 M/UL (ref 4–5.4)
SODIUM SERPL-SCNC: 140 MMOL/L (ref 136–145)
TROPONIN I SERPL DL<=0.01 NG/ML-MCNC: 0.03 NG/ML (ref 0.02–0.5)
TROPONIN I SERPL DL<=0.01 NG/ML-MCNC: 0.03 NG/ML (ref 0.02–0.5)
TROPONIN I SERPL DL<=0.01 NG/ML-MCNC: <0.03 NG/ML (ref 0.02–0.5)
TROPONIN I SERPL DL<=0.01 NG/ML-MCNC: <0.03 NG/ML (ref 0.02–0.5)
WBC # BLD AUTO: 9.06 K/UL (ref 3.9–12.7)

## 2019-07-29 PROCEDURE — 94640 AIRWAY INHALATION TREATMENT: CPT

## 2019-07-29 PROCEDURE — 97162 PT EVAL MOD COMPLEX 30 MIN: CPT

## 2019-07-29 PROCEDURE — 94760 N-INVAS EAR/PLS OXIMETRY 1: CPT

## 2019-07-29 PROCEDURE — 82550 ASSAY OF CK (CPK): CPT

## 2019-07-29 PROCEDURE — 97535 SELF CARE MNGMENT TRAINING: CPT

## 2019-07-29 PROCEDURE — 97530 THERAPEUTIC ACTIVITIES: CPT

## 2019-07-29 PROCEDURE — 27000221 HC OXYGEN, UP TO 24 HOURS

## 2019-07-29 PROCEDURE — 84484 ASSAY OF TROPONIN QUANT: CPT | Mod: 91

## 2019-07-29 PROCEDURE — 85025 COMPLETE CBC W/AUTO DIFF WBC: CPT

## 2019-07-29 PROCEDURE — 25000003 PHARM REV CODE 250: Performed by: NURSE PRACTITIONER

## 2019-07-29 PROCEDURE — G0378 HOSPITAL OBSERVATION PER HR: HCPCS

## 2019-07-29 PROCEDURE — 82553 CREATINE MB FRACTION: CPT

## 2019-07-29 PROCEDURE — 63600175 PHARM REV CODE 636 W HCPCS: Performed by: NURSE PRACTITIONER

## 2019-07-29 PROCEDURE — 82553 CREATINE MB FRACTION: CPT | Mod: 91

## 2019-07-29 PROCEDURE — 25000242 PHARM REV CODE 250 ALT 637 W/ HCPCS: Performed by: NURSE PRACTITIONER

## 2019-07-29 PROCEDURE — 97166 OT EVAL MOD COMPLEX 45 MIN: CPT

## 2019-07-29 PROCEDURE — 80053 COMPREHEN METABOLIC PANEL: CPT

## 2019-07-29 PROCEDURE — 82550 ASSAY OF CK (CPK): CPT | Mod: 91

## 2019-07-29 RX ORDER — IPRATROPIUM BROMIDE AND ALBUTEROL SULFATE 2.5; .5 MG/3ML; MG/3ML
3 SOLUTION RESPIRATORY (INHALATION) EVERY 4 HOURS PRN
Status: DISCONTINUED | OUTPATIENT
Start: 2019-07-30 | End: 2019-08-02 | Stop reason: HOSPADM

## 2019-07-29 RX ADMIN — TRAMADOL HYDROCHLORIDE 50 MG: 50 TABLET, COATED ORAL at 06:07

## 2019-07-29 RX ADMIN — METHYLPHENIDATE HYDROCHLORIDE 20 MG: 5 TABLET ORAL at 05:07

## 2019-07-29 RX ADMIN — LEVOTHYROXINE SODIUM 50 MCG: 25 TABLET ORAL at 05:07

## 2019-07-29 RX ADMIN — CEFTRIAXONE SODIUM 1 G: 1 INJECTION, POWDER, FOR SOLUTION INTRAMUSCULAR; INTRAVENOUS at 09:07

## 2019-07-29 RX ADMIN — IPRATROPIUM BROMIDE AND ALBUTEROL SULFATE 3 ML: .5; 3 SOLUTION RESPIRATORY (INHALATION) at 07:07

## 2019-07-29 RX ADMIN — IPRATROPIUM BROMIDE AND ALBUTEROL SULFATE 3 ML: .5; 3 SOLUTION RESPIRATORY (INHALATION) at 01:07

## 2019-07-29 RX ADMIN — AZELASTINE 137 MCG: 137 SPRAY, METERED NASAL at 09:07

## 2019-07-29 RX ADMIN — FLUTICASONE FUROATE AND VILANTEROL TRIFENATATE 1 PUFF: 200; 25 POWDER RESPIRATORY (INHALATION) at 07:07

## 2019-07-29 RX ADMIN — PANTOPRAZOLE SODIUM 40 MG: 40 TABLET, DELAYED RELEASE ORAL at 09:07

## 2019-07-29 RX ADMIN — OXYCODONE HYDROCHLORIDE AND ACETAMINOPHEN 500 MG: 500 TABLET ORAL at 09:07

## 2019-07-29 RX ADMIN — METHYLPHENIDATE HYDROCHLORIDE 20 MG: 5 TABLET ORAL at 09:07

## 2019-07-29 NOTE — PLAN OF CARE
07/29/19 0728   Patient Assessment/Suction   Level of Consciousness (AVPU) alert   Respiratory Effort Normal;Unlabored   Expansion/Accessory Muscles/Retractions no retractions;no use of accessory muscles   All Lung Fields Breath Sounds clear;diminished   PRE-TX-O2   O2 Device (Oxygen Therapy) nasal cannula   $ Is the patient on Low Flow Oxygen? Yes   Flow (L/min) 2   SpO2 95 %   Pulse 74   Resp 18   Aerosol Therapy   $ Aerosol Therapy Charges Aerosol Treatment   Daily Review of Necessity (SVN) completed   Respiratory Treatment Status (SVN) given   Treatment Route (SVN) mask   Patient Position (SVN) semi-Gunderson's   Post Treatment Assessment (SVN) increased aeration   Signs of Intolerance (SVN) none   Inhaler   $ Inhaler Charges MDI (Metered Dose Inahler) Treatment;Mouth rinsed post treatment   Respiratory Treatment Status (Inhaler) given;mouth rinsed post treatment   Treatment Route (Inhaler) mouthpiece   Signs of Intolerance (Inhaler) none   Breath Sounds Post-Respiratory Treatment   Throughout All Fields Post-Treatment All Fields   Throughout All Fields Post-Treatment clear   Post-treatment Heart Rate (beats/min) 78   Post-treatment Resp Rate (breaths/min) 18

## 2019-07-29 NOTE — PROGRESS NOTES
[facility]  Progress Note    Patient Name: Ruth Roach  MRN: [id number]  Patient Class: OP- Observation   Admission Date: 7/28/2019  Length of Stay: 0 days  Attending Physician: Evens Downs MD  Primary Care Provider: Shannon Romo MD        Subjective:     Principal Problem:Acute cystitis    Interval History: She is improving, getting back at her baseline dementia    Review of Systems  Objective:     Vital Signs (Most Recent):  Temp: 97.7 °F (36.5 °C) (07/29/19 1630)  Pulse: 88 (07/29/19 1630)  Resp: 18 (07/29/19 1630)  BP: 121/65 (07/29/19 1630)  SpO2: (!) 94 % (07/29/19 1630) Vital Signs (24h Range):  Temp:  [97.7 °F (36.5 °C)-98.5 °F (36.9 °C)] 97.7 °F (36.5 °C)  Pulse:  [55-94] 88  Resp:  [16-22] 18  SpO2:  [88 %-98 %] 94 %  BP: (111-128)/(57-76) 121/65     Weight: 57.6 kg (127 lb)  Body mass index is 21.13 kg/m².    Intake/Output Summary (Last 24 hours) at 7/29/2019 1829  Last data filed at 7/29/2019 1700  Gross per 24 hour   Intake 1000 ml   Output 150 ml   Net 850 ml      Physical Exam  Gen: no acute dsitress  Eyes: PERR:A, EOMI  Lungs: CTA b/l  Cardiac: RRR  Extremities: no edema  Psych: confused      Significant Labs:   BMP:   Recent Labs   Lab 07/30/19  0629         K 4.0      CO2 35*   BUN 13   CREATININE 0.5   CALCIUM 9.0     CBC:   Recent Labs   Lab 07/29/19  0447 07/30/19  0629   WBC 9.06 8.50   HGB 12.6 12.5   HCT 39.7 40.2    235             Assessment/Plan:      Active Diagnoses:    Diagnosis Date Noted POA    PRINCIPAL PROBLEM:  Acute cystitis [N30.00] 07/28/2019 Unknown    Delirium [R41.0] 07/28/2019 Unknown    Cystitis [N30.90] 07/28/2019 Yes      Problems Resolved During this Admission:     VTE Risk Mitigation (From admission, onward)        Ordered     IP VTE LOW RISK PATIENT  Once      07/28/19 3545        alter mental status/delirium-most like secondary to urinary tract infection  improving  -IV antibiotics  -Fall  precautions  -PT /OT eval and treat     Urinary tract infection  -Urine cultures sent in ED  -Calderon catheter  -IV antibiotics with Rocephin     Falls  -PT /OT eval and treat  -Fall precautions  - Radiographic imaging with no fractures or acute abnormalities     Essential hypertension- moderate control  -Continue home medications     History of atrial fibrillation-rate controlled  -continue aspirin     History of COPD-stable  -Continue home medications       Evens Downs MD  Department of Hospital Medicine   [facility]

## 2019-07-29 NOTE — CARE UPDATE
07/28/19 2326   Patient Assessment/Suction   Level of Consciousness (AVPU) alert   Expansion/Accessory Muscles/Retractions accessory muscle use   All Lung Fields Breath Sounds diminished;crackles   Rhythm/Pattern, Respiratory shallow   PRE-TX-O2   O2 Device (Oxygen Therapy) nasal cannula   $ Is the patient on Low Flow Oxygen? Yes   Flow (L/min) 2   SpO2 95 %   Pulse Oximetry Type Intermittent   Pulse 71   Resp 20

## 2019-07-29 NOTE — H&P
Formerly [facility]  History & Physical    SUBJECTIVE:     Chief Complaint/Reason for Admission: fall and confusion    History of Present Illness:  This is a 89-year-old female who presents with a report of a decreased level of consciousness.  The patient was found on the ground by her brother with whom she lives when he awoke yesterday morning.  History is currently being given by the patient's son who was not there at the time.  The son reports that a different son came over and helped place the patient back in her bed.  She apparently was awake alert and oriented. She is not complaining of any specific pain. However since that time she has been sleeping excessively and has been more confused than normal but does have a history of dementia.  Patient was found to have fever in the emergency room and the family were not aware that she had fever.  She denies any headache or neck pain.  She denies chest pain or shortness of breath. She has not been coughing or having any respiratory problems.  She has had urinary tract infections in the past.  She denies abdominal pain vomiting or diarrhea.  Since the fall she states she has had right hip and right knee pain. She has COPD and is on oxygen at home.  Family have not noticed any increase in chronic shortness of breath and have not noticed her to be coughing.  The family denies any other problems or complaints.          Review of patient's allergies indicates:  No Known Allergies    Past Medical History:   Diagnosis Date    Arthritis     right hip and right knee    Atrial fibrillation     Complex renal cyst 7/19/2012    COPD (chronic obstructive pulmonary disease)     HTN (hypertension)     Hyperlipidemia     Narcolepsy without cataplexy(347.00)     Osteoarthritis of hip     Thyroid disease     Vitamin B12 deficiency     Got shots at 's office-last time was 6 months ago     Past Surgical History:   Procedure Laterality Date    APPENDECTOMY      pt  claims with tubal pregnancy    CATARACT EXTRACTION      OU    ECTOPIC PREGNANCY SURGERY      EGD (ESOPHAGOGASTRODUODENOSCOPY) N/A 7/18/2012    Performed by [facility]    EYE SURGERY      bilateral cataract    KNEE ARTHROSCOPY W/ MENISCAL REPAIR  2007    left knee    MASS EXCISION  age 15    left shoulder     Family History   Problem Relation Age of Onset    Stroke Mother     Heart disease Father     Hypertension Father     Cancer Father         leukemia    Alcohol abuse Brother     Arthritis Brother     Cataracts Brother     Diabetes Brother     Cancer Son         stomach cancer    Heart disease Son     Hypertension Son     Hyperlipidemia Son     Diabetes Son     Alcohol abuse Son     Arthritis Son         back    Hyperlipidemia Son     Alcohol abuse Son     COPD Son     Arthritis Son         back    Arthritis Son         shoulder    Alcohol abuse Son     Alcohol abuse Paternal Aunt     Amblyopia Neg Hx     Blindness Neg Hx     Glaucoma Neg Hx     Macular degeneration Neg Hx     Retinal detachment Neg Hx     Strabismus Neg Hx     Thyroid disease Neg Hx      Social History     Tobacco Use    Smoking status: Former Smoker     Packs/day: 1.00     Years: 63.00     Pack years: 63.00    Smokeless tobacco: Never Used   Substance Use Topics    Alcohol use: Yes     Comment: very rarely    Drug use: No        Review of Systems:  Constitutional: positive for fatigue  Eyes: no visual changes  ENT: no nasal congestion or sore throat  Respiratory: no cough or shortness of breath  Cardiovascular: no chest pain or palpitations  Gastrointestinal: no nausea or vomiting, no abdominal pain or change in bowel habits  Genitourinary: positive for dysuria and nocturia  Integument/Breast: no rash or pruritis  Musculoskeletal: positive for muscle weakness and stiff joints  Neurological: no seizures or tremors  Behavioral/Psych: positive for anxiety and dementia    OBJECTIVE:      Vital Signs (Most Recent):  Temp: 100 °F (37.8 °C)(rectal) (07/28/19 1400)  Pulse: 78 (07/28/19 1745)  Resp: (!) 24 (07/28/19 1257)  BP: 128/63 (07/28/19 1600)  SpO2: (!) 94 % (07/28/19 1745)    Physical Exam:  General: icteric, no distress  Eyes: conjunctivae/corneas clear. PERRL..  HENT: Head:normocephalic, atraumatic. Ears:bilateral TM's and external ear canals normal. Nose: Nares normal. Septum midline. Mucosa normal. No drainage or sinus tenderness., no discharge. Throat: lips, mucosa, and tongue normal; teeth and gums normal and no throat erythema.  Neck: supple, symmetrical, trachea midline, no JVD and thyroid not enlarged, symmetric, no tenderness/mass/nodules  Lungs:  clear to auscultation bilaterally and normal respiratory effort  Cardiovascular: Heart: regular rate and rhythm, S1, S2 normal, no murmur, click, rub or gallop. Chest Wall: no tenderness. Extremities: no cyanosis or edema, or clubbing. Pulses: 2+ and symmetric.  Abdomen/Rectal: Abdomen: soft, non-tender non-distented; bowel sounds normal; no masses,  no organomegaly. Rectal: not examined  Skin: Skin color, texture, turgor normal. No rashes or lesions  Musculoskeletal:no clubbing, cyanosis  Neurologic: Normal strength and tone. No focal numbness or weakness  Mental status: Alert, oriented, thought content appropriate, alertness: alert  Psych/Behavioral:  Alert and oriented, appropriate affect.    Laboratory:  CBC:   Recent Labs   Lab 07/28/19  1410   WBC 10.91  10.91   RBC 4.13  4.13   HGB 13.7  13.7   HCT 43.0  43.0     291   *  104*   MCH 33.2*  33.2*   MCHC 31.9*  31.9*     BMP:   Recent Labs   Lab 07/28/19  1410   *      K 3.3*      CO2 34*   BUN 31*   CREATININE 0.7   CALCIUM 8.7   MG 1.8     CMP:   Recent Labs   Lab 07/28/19  1410   *   CALCIUM 8.7   ALBUMIN 3.3*   PROT 6.6      K 3.3*   CO2 34*      BUN 31*   CREATININE 0.7   ALKPHOS 97   ALT 28   AST 69*   BILITOT 1.2*      LFTs:   Recent Labs   Lab 07/28/19  1410   ALT 28   AST 69*   ALKPHOS 97   BILITOT 1.2*   PROT 6.6   ALBUMIN 3.3*     Coagulation:   Recent Labs   Lab 07/28/19  1410   INR 1.1   APTT 30.1     Cardiac markers:   Recent Labs   Lab 07/28/19  1410   TROPONINI 0.031     ABGs: No results for input(s): PH, PCO2, PO2, HCO3, POCSATURATED, BE in the last 168 hours.  Microbiology Results (last 7 days)     Procedure Component Value Units Date/Time    Blood culture #1 **CANNOT BE ORDERED STAT** [[id number]] Collected:  07/28/19 1413    Order Status:  Sent Specimen:  Blood Updated:  07/28/19 1443    Narrative:       RW    Blood culture #2 **CANNOT BE ORDERED STAT** [[id number]] Collected:  07/28/19 1427    Order Status:  Sent Specimen:  Blood Updated:  07/28/19 1443    Blood Culture #1 **CANNOT BE ORDERED STAT** [[id number]]     Order Status:  Canceled Specimen:  Blood         Specimen (12h ago, onward)    None        Recent Labs   Lab 07/28/19  1445   COLORU Yellow   SPECGRAV 1.030   PHUR 6.0   PROTEINUA 1+*   BACTERIA Many*   NITRITE Negative   LEUKOCYTESUR Negative   UROBILINOGEN Negative   HYALINECASTS 32*       Diagnostic Results:  Labs: Reviewed  ECG: Reviewed  X-Ray: Reviewed  reviewed    ASSESSMENT/PLAN:     1.alter mental status/delirium-most like secondary to urinary tract infection  -IV antibiotics  -Fall precautions  -PT /OT eval and treat    2. Urinary tract infection  -Urine cultures sent in ED  -Calderon catheter  -IV antibiotics with Rocephin    3. Falls  -PT /OT eval and treat  -Fall precautions  - Radiographic imaging with no fractures or acute abnormalities    4. Essential hypertension- moderate control  -Continue home medications    5. History of atrial fibrillation-rate controlled  -continue aspirin    6.advanced age    7. History of COPD-stable  -Continue home medications    Plan: Continue Current as above

## 2019-07-29 NOTE — PLAN OF CARE
Problem: Fall Injury Risk  Goal: Absence of Fall and Fall-Related Injury  Outcome: Ongoing (interventions implemented as appropriate)  Patient free from falls

## 2019-07-29 NOTE — PT/OT/SLP EVAL
Occupational Therapy   Evaluation    Name: Ruth Roach  MRN: [id number]  Admitting Diagnosis:  Acute cystitis      Recommendations:     Discharge Recommendations: nursing facility, skilled  Discharge Equipment Recommendations:  commode, shower chair  Barriers to discharge:  Decreased caregiver support    Assessment:     Ruth Roach is a 89 y.o. female with a medical diagnosis of Acute cystitis.  She presents with decreased independence with ADLs and functional mobility versus baseline and may benefit from skilled OT intervention. Performance deficits affecting function: weakness, impaired endurance, impaired self care skills, impaired functional mobilty, impaired cognition, pain, decreased safety awareness, edema.      Rehab Prognosis: Fair; patient would benefit from acute skilled OT services to address these deficits and reach maximum level of function.       Plan:     Patient to be seen 5 x/week to address the above listed problems via self-care/home management, therapeutic activities, therapeutic exercises  · Plan of Care Expires: 07/29/19  · Plan of Care Reviewed with: patient, son    Subjective     Chief Complaint: R knee pain  Patient/Family Comments/goals: to go home    Occupational Profile:  Living Environment: Pt lives in a single story home with a threshold [place]; per pt/son, the bathroom was recently renovated but neither were able to provide details   Previous level of function: Mod (I) with RW (non-compliant with RW use); household ambulation only; Mod (I) ADLs (should confirm as pt is a questionable historian and son present could not provide detailed history); the pt has 9 children and various other family members who make sure the pt has transportation, meals, etc.    Equipment Used at Home:  walker, rolling, rollator(pt/son are unsure if pt has a shower chair at home; need to confirm with DIL or other family member)  Assistance upon Discharge: unknown how much family can assist pt at d/c; son says  they are working on a plan     Pain/Comfort:  · Pain Rating 1: (pt unable to rate on scale )  · Location - Side 1: Right  · Location 1: knee  · Pain Addressed 1: Reposition(ice pack)  · Pain Rating Post-Intervention 1: (pt unable to rate on scale)    Patients cultural, spiritual, Anglican conflicts given the current situation: no    Objective:     Communicated with: RN prior to session.  Patient found supine with bed alarm, judge catheter upon OT entry to room.    General Precautions: Standard, fall   Orthopedic Precautions:N/A   Braces: N/A     Occupational Performance:    Bed Mobility:    · Patient completed Supine to Sit with moderate assistance    Functional Mobility/Transfers:  · Patient completed Sit <> Stand Transfer with moderate assistance  with  rolling walker   · Patient completed Bed <> Chair Transfer using Stand Pivot technique with moderate assistance with rolling walker  · Functional Mobility: NT; limited by pain in R knee    Activities of Daily Living:  · Grooming: Pt completed grooming tasks while seated in bedside chair with set-up assistance; thought processes were delayed and she required minimal cues to sequence tasks  · Lower Body Dressing: total assistance to don socks   · Toileting: judge catheter     Cognitive/Visual Perceptual:  Cognitive/Psychosocial Skills:     -       Oriented to: Person   -       Follows Commands/attention:Easily distracted and Follows one-step commands  -       Memory: Poor immediate recall  -       Safety awareness/insight to disability: impaired     Physical Exam:  Edema:  Moderate R knee  Sensation:    -       Intact  Dominant hand:    -       Right  Upper Extremity Range of Motion: WFL  Upper Extremity Strength: WFL   Strength: WFL  Fine Motor Coordination: intact     AMPAC 6 Click ADL:  AMPAC Total Score: 13    Treatment & Education:  OT role/POC; call for assist with mobility  Education:    Patient left up in chair with all lines intact, call button in  ZEUS luther notified and son present    GOALS:   Multidisciplinary Problems     Occupational Therapy Goals        Problem: Occupational Therapy Goal    Goal Priority Disciplines Outcome Interventions   Occupational Therapy Goal     OT, PT/OT     Description:  Goals to be met by: discharge    Patient will increase functional independence with ADLs by performing:    UE Dressing with Set-up Assistance.  LE Dressing with Moderate Assistance.  Grooming while standing with Contact Guard Assistance.  Toileting from bedside commode with Minimal Assistance for hygiene and clothing management.   Supine to sit with Contact Guard Assistance.  Toilet transfer to bedside commode with Minimal Assistance.                      History:     Past Medical History:   Diagnosis Date    Arthritis     right hip and right knee    Atrial fibrillation     Complex renal cyst 7/19/2012    COPD (chronic obstructive pulmonary disease)     HTN (hypertension)     Hyperlipidemia     Narcolepsy without cataplexy(347.00)     Osteoarthritis of hip     Thyroid disease     Vitamin B12 deficiency     Got shots at 's office-last time was 6 months ago       Past Surgical History:   Procedure Laterality Date    APPENDECTOMY      pt claims with tubal pregnancy    CATARACT EXTRACTION      OU    ECTOPIC PREGNANCY SURGERY      EGD (ESOPHAGOGASTRODUODENOSCOPY) N/A 7/18/2012    [facility]    EYE SURGERY      bilateral cataract    KNEE ARTHROSCOPY W/ MENISCAL REPAIR  2007    left knee    MASS EXCISION  age 15    left shoulder       Time Tracking:     OT Date of Treatment: 07/29/19  OT Start Time: 1019  OT Stop Time: 1052  OT Total Time (min): 33 min     Co-eval with PT    Billable Minutes:Evaluation 25  Self Care/Home Management 08    Yann Pond, OT  7/29/2019

## 2019-07-29 NOTE — NURSING
I did not collect the UA for this pt. I spoke with the NP earlier and we were unsure if she had already had the lab collected. The UA was collected in the ER before being admitted to the floor.

## 2019-07-29 NOTE — ED NOTES
Handoff report called to [facility], awaiting telemetry box, dr ward made aware that pt was not informed about admission

## 2019-07-29 NOTE — PLAN OF CARE
Problem: Fall Injury Risk  Goal: Absence of Fall and Fall-Related Injury  Outcome: Ongoing (interventions implemented as appropriate)  Assess pt needs and check bed alarm.  Intervention: Identify and Manage Contributors to Fall Injury Risk  Assess pt needs & check bed alarm      Problem: Adult Inpatient Plan of Care  Goal: Optimal Comfort and Wellbeing  Outcome: Ongoing (interventions implemented as appropriate)  Intervention: Monitor Pain and Promote Comfort  Pt denies pain

## 2019-07-29 NOTE — PT/OT/SLP EVAL
Physical Therapy Evaluation    Patient Name:  Ruth Roach   MRN:  [id number]    Recommendations:     Discharge Recommendations:  home health PT, nursing facility, skilled(SNF vs HHPT with 24/7 sup/ assist; depending on family availability)   Discharge Equipment Recommendations: commode   Barriers to discharge: None    Assessment:     Ruth Roach is a 89 y.o. female admitted with a medical diagnosis of Acute cystitis.  She presents with the following impairments/functional limitations:  weakness, edema, decreased ROM, impaired balance, pain, decreased coordination, gait instability, impaired endurance, impaired functional mobilty, impaired cognition, decreased safety awareness, impaired coordination PT limited at evaluation and initial treatment due to R knee pain.    Rehab Prognosis: Good; patient would benefit from acute skilled PT services to address these deficits and reach maximum level of function.    Recent Surgery: * No surgery found *      Plan:     During this hospitalization, patient to be seen 6 x/week to address the identified rehab impairments via gait training, therapeutic activities, therapeutic exercises and progress toward the following goals:    · Plan of Care Expires:  08/29/19    Subjective     Chief Complaint: R knee pain and dec LOC  Patient/Family Comments/goals: Pt would like to return home; edu pt and family on safe discharge plans and son to discuss with siblings  Pain/Comfort:  · Pain Rating 1: 8/10  · Location - Side 1: Right  · Location 1: knee  · Pain Addressed 1: Reposition, Other (see comments)(ice pack applied)  · Pain Rating Post-Intervention 1: 6/10    Patients cultural, spiritual, Restoration conflicts given the current situation:      Living Environment:  Pt lives at home with elderly brother and DIL comes to assist with ADLs. Multiple family members available for support.  Prior to admission, patients level of function was household ambulation and fair compliance with assistive  device.  Equipment used at home: walker, rolling, rollator.  DME owned (not currently used): none.  Upon discharge, patient will have assistance from family.    Objective:     Communicated with RN and OT prior to session.  Patient found supine with bed alarm, judge catheter  upon PT entry to room.    General Precautions: Standard,     Orthopedic Precautions:    Braces:       Exams:  · Cognitive Exam:  Patient is oriented to Person  · Sensation:  intact  · RLE ROM: Deficits: decreased ROM at R knee due to pain and guarding observed with MMT and ROM test  · RLE Strength: Deficits: 3-/5  · LLE ROM: WFL  · LLE Strength: WFL    Functional Mobility:  · Bed Mobility:     · Rolling Left:  moderate assistance  · Scooting: moderate assistance  · Supine to Sit: moderate assistance  · Transfers:     · Sit to Stand:  moderate assistance with rolling walker  · Bed to Chair: moderate assistance with  rolling walker  using  Stand Pivot  · Gait: not tested 2/2 R knee pain  · Balance: good sitting balance; poor standing balance      Therapeutic Activities and Exercises:   bed mobility and transfer training to chair at bedside    AM-PAC 6 CLICK MOBILITY  Total Score:11     Patient left up in chair with call button in reach and OT and son present.    GOALS:   Multidisciplinary Problems     Physical Therapy Goals        Problem: Physical Therapy Goal    Goal Priority Disciplines Outcome Goal Variances Interventions   Physical Therapy Goal     PT, PT/OT      Description:  Goals to be met by: 19     Patient will increase functional independence with mobility by performin. Supine to sit with Stand-by Assistance  2. Sit to stand transfer with Contact Guard Assistance  3. Bed to chair transfer with Contact Guard Assistance using Rolling Walker  4. Gait  x 40 feet with Contact Guard Assistance using Rolling Walker.                       History:     Past Medical History:   Diagnosis Date    Arthritis     right hip and right knee     Atrial fibrillation     Complex renal cyst 7/19/2012    COPD (chronic obstructive pulmonary disease)     HTN (hypertension)     Hyperlipidemia     Narcolepsy without cataplexy(347.00)     Osteoarthritis of hip     Thyroid disease     Vitamin B12 deficiency     Got shots at 's office-last time was 6 months ago       Past Surgical History:   Procedure Laterality Date    APPENDECTOMY      pt claims with tubal pregnancy    CATARACT EXTRACTION      OU    ECTOPIC PREGNANCY SURGERY      EGD (ESOPHAGOGASTRODUODENOSCOPY) N/A 7/18/2012    [facility]    EYE SURGERY      bilateral cataract    KNEE ARTHROSCOPY W/ MENISCAL REPAIR  2007    left knee    MASS EXCISION  age 15    left shoulder       Time Tracking:     PT Received On: 07/29/19  PT Start Time: 1018     PT Stop Time: 1052  PT Total Time (min): 34 min     Billable Minutes: Evaluation 24 and Therapeutic Activity 10      Miriam Vee, PT  07/29/2019

## 2019-07-30 LAB
ANION GAP SERPL CALC-SCNC: 4 MMOL/L (ref 8–16)
BACTERIA #/AREA URNS HPF: NEGATIVE /HPF
BASOPHILS # BLD AUTO: 0.04 K/UL (ref 0–0.2)
BASOPHILS NFR BLD: 0.5 % (ref 0–1.9)
BILIRUB UR QL STRIP: NEGATIVE
BUN SERPL-MCNC: 13 MG/DL (ref 8–23)
CALCIUM SERPL-MCNC: 9 MG/DL (ref 8.7–10.5)
CHLORIDE SERPL-SCNC: 101 MMOL/L (ref 95–110)
CLARITY UR: CLEAR
CO2 SERPL-SCNC: 35 MMOL/L (ref 23–29)
COLOR UR: YELLOW
CREAT SERPL-MCNC: 0.5 MG/DL (ref 0.5–1.4)
DIFFERENTIAL METHOD: ABNORMAL
EOSINOPHIL # BLD AUTO: 0.4 K/UL (ref 0–0.5)
EOSINOPHIL NFR BLD: 5.2 % (ref 0–8)
ERYTHROCYTE [DISTWIDTH] IN BLOOD BY AUTOMATED COUNT: 12.7 % (ref 11.5–14.5)
EST. GFR  (AFRICAN AMERICAN): >60 ML/MIN/1.73 M^2
EST. GFR  (NON AFRICAN AMERICAN): >60 ML/MIN/1.73 M^2
GLUCOSE SERPL-MCNC: 100 MG/DL (ref 70–110)
GLUCOSE UR QL STRIP: NEGATIVE
HCT VFR BLD AUTO: 40.2 % (ref 37–48.5)
HGB BLD-MCNC: 12.5 G/DL (ref 12–16)
HGB UR QL STRIP: ABNORMAL
HYALINE CASTS #/AREA URNS LPF: 6 /LPF
IMM GRANULOCYTES # BLD AUTO: 0.02 K/UL (ref 0–0.04)
IMM GRANULOCYTES NFR BLD AUTO: 0.2 % (ref 0–0.5)
KETONES UR QL STRIP: NEGATIVE
LEUKOCYTE ESTERASE UR QL STRIP: ABNORMAL
LYMPHOCYTES # BLD AUTO: 1.5 K/UL (ref 1–4.8)
LYMPHOCYTES NFR BLD: 17.4 % (ref 18–48)
MCH RBC QN AUTO: 32.1 PG (ref 27–31)
MCHC RBC AUTO-ENTMCNC: 31.1 G/DL (ref 32–36)
MCV RBC AUTO: 103 FL (ref 82–98)
MICROSCOPIC COMMENT: ABNORMAL
MONOCYTES # BLD AUTO: 1.2 K/UL (ref 0.3–1)
MONOCYTES NFR BLD: 13.5 % (ref 4–15)
NEUTROPHILS # BLD AUTO: 5.4 K/UL (ref 1.8–7.7)
NEUTROPHILS NFR BLD: 63.2 % (ref 38–73)
NITRITE UR QL STRIP: NEGATIVE
NRBC BLD-RTO: 0 /100 WBC
PH UR STRIP: 6 [PH] (ref 5–8)
PLATELET # BLD AUTO: 235 K/UL (ref 150–350)
PMV BLD AUTO: 11.6 FL (ref 9.2–12.9)
POTASSIUM SERPL-SCNC: 4 MMOL/L (ref 3.5–5.1)
PROT UR QL STRIP: ABNORMAL
RBC # BLD AUTO: 3.89 M/UL (ref 4–5.4)
RBC #/AREA URNS HPF: 75 /HPF (ref 0–4)
SODIUM SERPL-SCNC: 140 MMOL/L (ref 136–145)
SP GR UR STRIP: 1.01 (ref 1–1.03)
SQUAMOUS #/AREA URNS HPF: 1 /HPF
URN SPEC COLLECT METH UR: ABNORMAL
UROBILINOGEN UR STRIP-ACNC: NEGATIVE EU/DL
WBC # BLD AUTO: 8.5 K/UL (ref 3.9–12.7)
WBC #/AREA URNS HPF: 4 /HPF (ref 0–5)

## 2019-07-30 PROCEDURE — 97530 THERAPEUTIC ACTIVITIES: CPT

## 2019-07-30 PROCEDURE — 25000003 PHARM REV CODE 250: Performed by: NURSE PRACTITIONER

## 2019-07-30 PROCEDURE — 81001 URINALYSIS AUTO W/SCOPE: CPT

## 2019-07-30 PROCEDURE — 63600175 PHARM REV CODE 636 W HCPCS: Performed by: NURSE PRACTITIONER

## 2019-07-30 PROCEDURE — 85025 COMPLETE CBC W/AUTO DIFF WBC: CPT

## 2019-07-30 PROCEDURE — 80048 BASIC METABOLIC PNL TOTAL CA: CPT

## 2019-07-30 PROCEDURE — G0378 HOSPITAL OBSERVATION PER HR: HCPCS

## 2019-07-30 PROCEDURE — 27000221 HC OXYGEN, UP TO 24 HOURS

## 2019-07-30 RX ORDER — MORPHINE SULFATE 2 MG/ML
2 INJECTION, SOLUTION INTRAMUSCULAR; INTRAVENOUS ONCE
Status: DISCONTINUED | OUTPATIENT
Start: 2019-07-30 | End: 2019-08-02 | Stop reason: HOSPADM

## 2019-07-30 RX ADMIN — CEFTRIAXONE SODIUM 1 G: 1 INJECTION, POWDER, FOR SOLUTION INTRAMUSCULAR; INTRAVENOUS at 09:07

## 2019-07-30 RX ADMIN — AZELASTINE 137 MCG: 137 SPRAY, METERED NASAL at 09:07

## 2019-07-30 RX ADMIN — PANTOPRAZOLE SODIUM 40 MG: 40 TABLET, DELAYED RELEASE ORAL at 09:07

## 2019-07-30 RX ADMIN — ACETAMINOPHEN 650 MG: 325 TABLET ORAL at 09:07

## 2019-07-30 RX ADMIN — LEVOTHYROXINE SODIUM 50 MCG: 25 TABLET ORAL at 05:07

## 2019-07-30 RX ADMIN — FLUTICASONE FUROATE AND VILANTEROL TRIFENATATE 1 PUFF: 200; 25 POWDER RESPIRATORY (INHALATION) at 11:07

## 2019-07-30 RX ADMIN — METHYLPHENIDATE HYDROCHLORIDE 20 MG: 5 TABLET ORAL at 12:07

## 2019-07-30 RX ADMIN — TRAMADOL HYDROCHLORIDE 50 MG: 50 TABLET, COATED ORAL at 01:07

## 2019-07-30 RX ADMIN — OXYCODONE HYDROCHLORIDE AND ACETAMINOPHEN 500 MG: 500 TABLET ORAL at 09:07

## 2019-07-30 NOTE — PLAN OF CARE
07/30/19 1020   Discharge Assessment   Assessment Type Discharge Planning Assessment   Confirmed/corrected address and phone number on facesheet? Yes   Assessment information obtained from? Patient;Caregiver   Communicated expected length of stay with patient/caregiver no   Prior to hospitilization cognitive status: Alert/Oriented   Prior to hospitalization functional status: Assistive Equipment   Current cognitive status: Alert/Oriented   Current Functional Status: Needs Assistance   Facility Arrived From: HOME   Lives With sibling(s)   Able to Return to Prior Arrangements other (see comments)   Is patient able to care for self after discharge? Unable to determine at this time (comments)   Who are your caregiver(s) and their phone number(s)? DTR-IN-KATIE ESPARZA - [phone]; ROSE ESPARZA - [phone]   Patient's perception of discharge disposition home or selfcare;home health   Readmission Within the Last 30 Days unable to assess   Equipment Currently Used at Home rollator;wheelchair;oxygen;nebulizer   Do you have any problems affording any of your prescribed medications? TBD   Does the patient have transportation home? Yes   Transportation Anticipated family or friend will provide   Discharge Plan A Home Health   Discharge Plan B Skilled Nursing Facility   DME Needed Upon Discharge  3-in-1 commode   Patient/Family in Agreement with Plan yes   THIS CM SPOKE TO THE PATIENT, PATIENT'S DTR IN LAW Elliot ESPARZA, AND SON - PORTILLO ESPARZA.  PATIENT LIVES WITH HER 86 Y/O BROTHER - MARA MENJIVAR.   FAMILY HAD QUESTIONS ABOUT DISCHARGE PLANNING.  THE FAMILY WOULD ULTIMATELY LIKE FOR THE PATIENT TO RETURN HOME AT DISCHARGE, BUT WOULD LIKE INPUT / RECOMMENDATIONS FROM THE PATIENT'S HOSPITALIST, THERAPY, AND .   PATIENT RECEIVED HOME OXYGEN FROM [employer].  PATIENT UTILIZED [employer] IN THE PAST (BUT WAS DISCHARGED FROM THEIR SERVICE).  THIS CM WILL CONTINUE TO FOLLOW THIS PATIENT STAY FOR  DISCHARGE PLANNING NEEDS.

## 2019-07-30 NOTE — PROGRESS NOTES
"  [facility]  Adult Nutrition  Consult Note    SUMMARY     Recommendations    Recommendation/Intervention: 1.) Continue Ensure Enlive TID 2.) Encourage PO intake and provide meal assist all meals  Goals: 1.) Consume >75% meals/supplements 2.) Maintain weight 3.) Hostesss to assist with meal selections daily  Nutrition Goal Status: progressing towards goal    Reason for Assessment    Reason For Assessment: (per RN request)  Diagnosis: (acute cystitis)    Nutrition Risk Screen    Nutrition Risk Screen: no indicators present    Nutrition/Diet History    Patient Reported Diet/Restrictions/Preferences: general  Spiritual, Cultural Beliefs, Yarsanism Practices, Values that Affect Care: no  Supplemental Drinks or Food Habits: Boost Plus  Food Allergies: NKFA  Factors Affecting Nutritional Intake: decreased appetite    Anthropometrics    Temp: 97.7 °F (36.5 °C)  Height: 5' 5" (165.1 cm)  Height (inches): 65 in  Weight Method: Stated  Weight: 56.7 kg (125 lb)  Weight (lb): 125 lb  Ideal Body Weight (IBW), Female: 125 lb  % Ideal Body Weight, Female (lb): 100 lb  BMI (Calculated): 20.8  BMI Grade: 18.5-24.9 - normal  Weight Loss: (unknown)       Lab/Procedures/Meds    Pertinent Labs Reviewed: reviewed  Pertinent Labs Comments: glu: 100  Pertinent Medications Reviewed: reviewed  Pertinent Medications Comments: vitamin C, rocephin, synthroid, pantoprazole, KCL    Physical Findings/Assessment    Noted mild temporal wasting    Estimated/Assessed Needs    Weight Used For Calorie Calculations: 56.7 kg (125 lb)  Energy Calorie Requirements (kcal): 1701 (30 kcal/kg)  Energy Need Method: Kcal/kg  Protein Requirements: 68.18 kg (1.2 g/kg)  Weight Used For Protein Calculations: 56.7 kg (125 lb)     Estimated Fluid Requirement Method: RDA Method  RDA Method (mL): 1701         Nutrition Prescription Ordered    Current Diet Order: cardiac   Oral Nutrition Supplement: Ensure Enlive TID (1050 kcal, 60 g pro)    Evaluation of " Received Nutrient/Fluid Intake    Energy Calories Required: not meeting needs  Protein Required: not meeting needs  Tolerance: tolerating    % Meal Intake: 0 - 25 %    Nutrition Risk    Level of Risk/Frequency of Follow-up: high     Assessment and Plan    Pt assessed per RN request. Pt with confusion at times, no family present during visit. Nutrition care discussed with staff. Minimal PO intake. Noted ONS ordered. Denies chew/swallow difficulties. No N/V.     Monitor and Evaluation    Food and Nutrient Intake: food and beverage intake, energy intake  Anthropometric Measurements: weight       Nutrition Follow-Up    RD Follow-up?: Yes     Ashli Chiu  07/30/2019  2:31 PM

## 2019-07-30 NOTE — PROGRESS NOTES
[facility] Medicine  Progress Note    Patient Name: Ruth Roach  MRN: [id number]  Patient Class: OP- Observation   Admission Date: 7/28/2019  Length of Stay: 0 days  Attending Physician: Evens Downs MD  Primary Care Provider: Shannon Romo MD        Subjective:     Principal Problem:Acute cystitis    Interval History:  Mild improvement in confusion, and today patient is likely at her baseline dementia    Review of Systems  Objective:     Vital Signs (Most Recent):  Temp: 98.3 °F (36.8 °C) (07/30/19 1630)  Pulse: 89 (07/30/19 1630)  Resp: 20 (07/30/19 1630)  BP: 130/76 (07/30/19 1630)  SpO2: 95 % (07/30/19 1630) Vital Signs (24h Range):  Temp:  [97.7 °F (36.5 °C)-98.8 °F (37.1 °C)] 98.3 °F (36.8 °C)  Pulse:  [82-96] 89  Resp:  [16-20] 20  SpO2:  [95 %-96 %] 95 %  BP: (123-155)/(73-80) 130/76     Weight: 56.7 kg (125 lb)  Body mass index is 20.8 kg/m².    Intake/Output Summary (Last 24 hours) at 7/30/2019 1849  Last data filed at 7/30/2019 1700  Gross per 24 hour   Intake 890 ml   Output 1900 ml   Net -1010 ml      Physical Exam  Physical Exam  Gen: no acute dsitress  Eyes: PERRLA, EOMI  Lungs: CTA b/l  Cardiac: RRR  Extremities: no edema  Psych: at baseline dementia      Significant Labs:   BMP:   Recent Labs   Lab 07/30/19  0629         K 4.0      CO2 35*   BUN 13   CREATININE 0.5   CALCIUM 9.0     CBC:   Recent Labs   Lab 07/29/19  0447 07/30/19  0629   WBC 9.06 8.50   HGB 12.6 12.5   HCT 39.7 40.2    235         Assessment/Plan:      Active Diagnoses:    Diagnosis Date Noted POA    PRINCIPAL PROBLEM:  Acute cystitis [N30.00] 07/28/2019 Unknown    Delirium [R41.0] 07/28/2019 Unknown    Cystitis [N30.90] 07/28/2019 Yes      Problems Resolved During this Admission:     VTE Risk Mitigation (From admission, onward)        Ordered     IP VTE LOW RISK PATIENT  Once      07/28/19 4385        Acute metabolic encephalopathy/delirium-most like secondary to  urinary tract infection  Improving, likely at baseline dementia  Will need placement  Continue ceftriaxone IVPB  -Fall precautions  -PT /OT eval and treat     Urinary tract infection  UCx pending  -Calderon catheter  -IV antibiotics with Rocephin     Falls  -PT /OT eval and treat  -Fall precautions  - Radiographic imaging with no fractures or acute abnormalities     Essential hypertension- moderate control  -Continue home medications     History of atrial fibrillation-rate controlled  -continue aspirin     History of COPD-stable  -Continue home medications    Evens Downs MD  Department of Hospital Medicine   [facility]

## 2019-07-30 NOTE — CARE UPDATE
07/30/19 1135   Patient Assessment/Suction   Level of Consciousness (AVPU) alert   All Lung Fields Breath Sounds clear   Rhythm/Pattern, Respiratory unlabored   PRE-TX-O2   O2 Device (Oxygen Therapy) nasal cannula   $ Is the patient on Low Flow Oxygen? Yes   Flow (L/min) 2   SpO2 96 %   Pulse 82   Resp 18

## 2019-07-30 NOTE — PLAN OF CARE
Problem: Fall Injury Risk  Goal: Absence of Fall and Fall-Related Injury  Outcome: Ongoing (interventions implemented as appropriate)  Patient remains free of falls

## 2019-07-30 NOTE — PLAN OF CARE
Problem: Physical Therapy Goal  Goal: Physical Therapy Goal  Goals to be met by: 19     Patient will increase functional independence with mobility by performin. Supine to sit with Stand-by Assistance  2. Sit to stand transfer with Contact Guard Assistance  3. Bed to chair transfer with Contact Guard Assistance using Rolling Walker  4. Gait  x 40 feet with Contact Guard Assistance using Rolling Walker.      Outcome: Ongoing (interventions implemented as appropriate)  Pt seen for PT tx; transfer training  to improve function and safety

## 2019-07-30 NOTE — PT/OT/SLP PROGRESS
"Occupational Therapy   Treatment    Name: Ruth Roach  MRN: [id number]  Admitting Diagnosis:  Acute cystitis       Recommendations:     Discharge Recommendations: nursing facility, skilled(pt could go home with HH if she has 24/7 SPV )  Discharge Equipment Recommendations:  commode, shower chair  Barriers to discharge:  Decreased caregiver support    Assessment:     Ruth Roach is a 89 y.o. female with a medical diagnosis of Acute cystitis.  She presents with improved performance of functional toilet transfers today to Contact Guard Assistance of one person using RW.  She also had decreased c/o pain in her R knee.  She had mild SOB after t/f training.  Performance deficits affecting function are weakness, impaired endurance, pain, cognitive deficits (decreased STM due to dementia with decreased carryover of learning).      Rehab Prognosis:  Fair; patient would benefit from acute skilled OT services to address these deficits and reach maximum level of function.       Plan:     Patient to be seen 5 x/week to address the above listed problems via self-care/home management, therapeutic activities, therapeutic exercises  · Plan of Care Expires: 07/29/19  · Plan of Care Reviewed with: patient    Subjective     Pain/Comfort:  · Pain Rating 1: (pt reports "a little" pain in R knee)    Objective:     Communicated with: RN prior to session.  Patient found supine with bed alarm, judge catheter upon OT entry to room.    General Precautions: Standard, fall   Orthopedic Precautions:N/A   Braces: N/A     Occupational Performance:     Bed Mobility:    · Patient completed Scooting/Bridging with stand by assistance  · Patient completed Supine to Sit with stand by assistance  · Patient completed Sit to Supine with stand by assistance     Functional Mobility/Transfers:  · Patient completed Sit <> Stand Transfer with contact guard assistance  with  rolling walker   · Patient completed Toilet Transfer Stand Pivot technique with contact " guard assistance with  rolling walker to/from bedside commode    [facility] 6 Click ADL: 15    Treatment & Education:  Safe transfer technique when using RW; pt required repetition of safety cues with each t/f due to STM deficits     Patient left supine with all lines intact, call button in reach and bed alarm onEducation:      GOALS:   Multidisciplinary Problems     Occupational Therapy Goals        Problem: Occupational Therapy Goal    Goal Priority Disciplines Outcome Interventions   Occupational Therapy Goal     OT, PT/OT     Description:  Goals to be met by: discharge    Patient will increase functional independence with ADLs by performing:    UE Dressing with Set-up Assistance.  LE Dressing with Moderate Assistance.  Grooming while standing with Contact Guard Assistance.  Toileting from bedside commode with Minimal Assistance for hygiene and clothing management.   Supine to sit with Contact Guard Assistance.  Toilet transfer to bedside commode with Minimal Assistance.- Met on 7/30  Toilet transfer to bedside commode with Stand-by assistance and minimal verbal cues for safe technique- NEW GOAL                       Time Tracking:     OT Date of Treatment: 07/30/19  OT Start Time: 1445  OT Stop Time: 1508  OT Total Time (min): 23 min    Billable Minutes:Therapeutic Activity 23    Yann Pond, ELIZABETH  7/30/2019

## 2019-07-30 NOTE — PT/OT/SLP PROGRESS
Physical Therapy Treatment    Patient Name:  uRth Roach   MRN:  [id number]    Recommendations:     Discharge Recommendations:  nursing facility, skilled   Discharge Equipment Recommendations: commode   Barriers to discharge: None    Assessment:     Ruth Roach is a 89 y.o. female admitted with a medical diagnosis of Acute cystitis.  She presents with the following impairments/functional limitations:  weakness, impaired endurance Pt with increased fear throughout session.     Rehab Prognosis: Fair; patient would benefit from acute skilled PT services to address these deficits and reach maximum level of function.    Recent Surgery: * No surgery found *      Plan:     During this hospitalization, patient to be seen 6 x/week to address the identified rehab impairments via gait training, therapeutic activities, therapeutic exercises and progress toward the following goals:    · Plan of Care Expires:  08/29/19    Subjective     Chief Complaint: fear of falling while moving  Patient/Family Comments/goals: return home  Pain/Comfort:  ·        Objective:     Communicated with RN prior to session.  Patient found supine with bed alarm, judge catheter upon PT entry to room.     General Precautions: Standard, fall   Orthopedic Precautions:N/A   Braces: N/A     Functional Mobility:  · Bed Mobility:     · Supine to Sit: moderate assistance  · Transfers:     · Sit to Stand:  moderate assistance with rolling walker  · Bed to Chair: moderate assistance with  rolling walker  using  Stand Pivot      AM-PAC 6 CLICK MOBILITY  Turning over in bed (including adjusting bedclothes, sheets and blankets)?: 2  Sitting down on and standing up from a chair with arms (e.g., wheelchair, bedside commode, etc.): 2  Moving from lying on back to sitting on the side of the bed?: 2  Moving to and from a bed to a chair (including a wheelchair)?: 3  Need to walk in hospital room?: 3  Climbing 3-5 steps with a railing?: 1  Basic Mobility Total Score:  13       Therapeutic Activities and Exercises:   bed mobility; sitting EOB for trunk control and midline orientation; sit<> stands; transfer training      Patient left up in chair with call button in reach, chair alarm on and RN notified..    GOALS:   Multidisciplinary Problems     Physical Therapy Goals        Problem: Physical Therapy Goal    Goal Priority Disciplines Outcome Goal Variances Interventions   Physical Therapy Goal     PT, PT/OT Ongoing (interventions implemented as appropriate)     Description:  Goals to be met by: 19     Patient will increase functional independence with mobility by performin. Supine to sit with Stand-by Assistance  2. Sit to stand transfer with Contact Guard Assistance  3. Bed to chair transfer with Contact Guard Assistance using Rolling Walker  4. Gait  x 40 feet with Contact Guard Assistance using Rolling Walker.                       Time Tracking:     PT Received On: 19  PT Start Time: 1112     PT Stop Time: 1136  PT Total Time (min): 24 min     Billable Minutes: Therapeutic Activity 24    Treatment Type: Treatment  PT/PTA: PTA     PTA Visit Number: 1     Hillary Hammant, CHRIS  2019

## 2019-07-31 LAB
ANION GAP SERPL CALC-SCNC: 6 MMOL/L (ref 8–16)
ANION GAP SERPL CALC-SCNC: 6 MMOL/L (ref 8–16)
BACTERIA UR CULT: NO GROWTH
BASOPHILS # BLD AUTO: 0.04 K/UL (ref 0–0.2)
BASOPHILS # BLD AUTO: 0.04 K/UL (ref 0–0.2)
BASOPHILS NFR BLD: 0.5 % (ref 0–1.9)
BASOPHILS NFR BLD: 0.5 % (ref 0–1.9)
BUN SERPL-MCNC: 11 MG/DL (ref 8–23)
BUN SERPL-MCNC: 11 MG/DL (ref 8–23)
CALCIUM SERPL-MCNC: 8.7 MG/DL (ref 8.7–10.5)
CALCIUM SERPL-MCNC: 8.7 MG/DL (ref 8.7–10.5)
CHLORIDE SERPL-SCNC: 100 MMOL/L (ref 95–110)
CHLORIDE SERPL-SCNC: 100 MMOL/L (ref 95–110)
CO2 SERPL-SCNC: 34 MMOL/L (ref 23–29)
CO2 SERPL-SCNC: 34 MMOL/L (ref 23–29)
CREAT SERPL-MCNC: 0.6 MG/DL (ref 0.5–1.4)
CREAT SERPL-MCNC: 0.6 MG/DL (ref 0.5–1.4)
DIFFERENTIAL METHOD: ABNORMAL
DIFFERENTIAL METHOD: ABNORMAL
EOSINOPHIL # BLD AUTO: 0.5 K/UL (ref 0–0.5)
EOSINOPHIL # BLD AUTO: 0.5 K/UL (ref 0–0.5)
EOSINOPHIL NFR BLD: 6.2 % (ref 0–8)
EOSINOPHIL NFR BLD: 6.2 % (ref 0–8)
ERYTHROCYTE [DISTWIDTH] IN BLOOD BY AUTOMATED COUNT: 12.5 % (ref 11.5–14.5)
ERYTHROCYTE [DISTWIDTH] IN BLOOD BY AUTOMATED COUNT: 12.5 % (ref 11.5–14.5)
EST. GFR  (AFRICAN AMERICAN): >60 ML/MIN/1.73 M^2
EST. GFR  (AFRICAN AMERICAN): >60 ML/MIN/1.73 M^2
EST. GFR  (NON AFRICAN AMERICAN): >60 ML/MIN/1.73 M^2
EST. GFR  (NON AFRICAN AMERICAN): >60 ML/MIN/1.73 M^2
GLUCOSE SERPL-MCNC: 100 MG/DL (ref 70–110)
GLUCOSE SERPL-MCNC: 100 MG/DL (ref 70–110)
HCT VFR BLD AUTO: 39.6 % (ref 37–48.5)
HCT VFR BLD AUTO: 39.6 % (ref 37–48.5)
HGB BLD-MCNC: 12.7 G/DL (ref 12–16)
HGB BLD-MCNC: 12.7 G/DL (ref 12–16)
IMM GRANULOCYTES # BLD AUTO: 0.02 K/UL (ref 0–0.04)
IMM GRANULOCYTES # BLD AUTO: 0.02 K/UL (ref 0–0.04)
IMM GRANULOCYTES NFR BLD AUTO: 0.2 % (ref 0–0.5)
IMM GRANULOCYTES NFR BLD AUTO: 0.2 % (ref 0–0.5)
LYMPHOCYTES # BLD AUTO: 1.8 K/UL (ref 1–4.8)
LYMPHOCYTES # BLD AUTO: 1.8 K/UL (ref 1–4.8)
LYMPHOCYTES NFR BLD: 21.8 % (ref 18–48)
LYMPHOCYTES NFR BLD: 21.8 % (ref 18–48)
MCH RBC QN AUTO: 32.1 PG (ref 27–31)
MCH RBC QN AUTO: 32.1 PG (ref 27–31)
MCHC RBC AUTO-ENTMCNC: 32.1 G/DL (ref 32–36)
MCHC RBC AUTO-ENTMCNC: 32.1 G/DL (ref 32–36)
MCV RBC AUTO: 100 FL (ref 82–98)
MCV RBC AUTO: 100 FL (ref 82–98)
MONOCYTES # BLD AUTO: 0.9 K/UL (ref 0.3–1)
MONOCYTES # BLD AUTO: 0.9 K/UL (ref 0.3–1)
MONOCYTES NFR BLD: 10.8 % (ref 4–15)
MONOCYTES NFR BLD: 10.8 % (ref 4–15)
NEUTROPHILS # BLD AUTO: 4.9 K/UL (ref 1.8–7.7)
NEUTROPHILS # BLD AUTO: 4.9 K/UL (ref 1.8–7.7)
NEUTROPHILS NFR BLD: 60.5 % (ref 38–73)
NEUTROPHILS NFR BLD: 60.5 % (ref 38–73)
NRBC BLD-RTO: 0 /100 WBC
NRBC BLD-RTO: 0 /100 WBC
PLATELET # BLD AUTO: 255 K/UL (ref 150–350)
PLATELET # BLD AUTO: 255 K/UL (ref 150–350)
PMV BLD AUTO: 11 FL (ref 9.2–12.9)
PMV BLD AUTO: 11 FL (ref 9.2–12.9)
POTASSIUM SERPL-SCNC: 3.7 MMOL/L (ref 3.5–5.1)
POTASSIUM SERPL-SCNC: 3.7 MMOL/L (ref 3.5–5.1)
RBC # BLD AUTO: 3.96 M/UL (ref 4–5.4)
RBC # BLD AUTO: 3.96 M/UL (ref 4–5.4)
SODIUM SERPL-SCNC: 140 MMOL/L (ref 136–145)
SODIUM SERPL-SCNC: 140 MMOL/L (ref 136–145)
WBC # BLD AUTO: 8.09 K/UL (ref 3.9–12.7)
WBC # BLD AUTO: 8.09 K/UL (ref 3.9–12.7)

## 2019-07-31 PROCEDURE — 85025 COMPLETE CBC W/AUTO DIFF WBC: CPT

## 2019-07-31 PROCEDURE — 27000221 HC OXYGEN, UP TO 24 HOURS

## 2019-07-31 PROCEDURE — 97110 THERAPEUTIC EXERCISES: CPT

## 2019-07-31 PROCEDURE — 94640 AIRWAY INHALATION TREATMENT: CPT

## 2019-07-31 PROCEDURE — 97116 GAIT TRAINING THERAPY: CPT

## 2019-07-31 PROCEDURE — 94760 N-INVAS EAR/PLS OXIMETRY 1: CPT

## 2019-07-31 PROCEDURE — 99900035 HC TECH TIME PER 15 MIN (STAT)

## 2019-07-31 PROCEDURE — 80048 BASIC METABOLIC PNL TOTAL CA: CPT

## 2019-07-31 PROCEDURE — G0378 HOSPITAL OBSERVATION PER HR: HCPCS

## 2019-07-31 PROCEDURE — 25000003 PHARM REV CODE 250: Performed by: NURSE PRACTITIONER

## 2019-07-31 PROCEDURE — 94761 N-INVAS EAR/PLS OXIMETRY MLT: CPT

## 2019-07-31 RX ADMIN — LEVOTHYROXINE SODIUM 50 MCG: 25 TABLET ORAL at 05:07

## 2019-07-31 RX ADMIN — FLUTICASONE FUROATE AND VILANTEROL TRIFENATATE 1 PUFF: 200; 25 POWDER RESPIRATORY (INHALATION) at 09:07

## 2019-07-31 RX ADMIN — AZELASTINE 137 MCG: 137 SPRAY, METERED NASAL at 09:07

## 2019-07-31 RX ADMIN — ACETAMINOPHEN 650 MG: 325 TABLET ORAL at 01:07

## 2019-07-31 RX ADMIN — OXYCODONE HYDROCHLORIDE AND ACETAMINOPHEN 500 MG: 500 TABLET ORAL at 08:07

## 2019-07-31 RX ADMIN — PANTOPRAZOLE SODIUM 40 MG: 40 TABLET, DELAYED RELEASE ORAL at 08:07

## 2019-07-31 RX ADMIN — TRAMADOL HYDROCHLORIDE 50 MG: 50 TABLET, COATED ORAL at 07:07

## 2019-07-31 NOTE — PLAN OF CARE
07/31/19 0905   Patient Assessment/Suction   Level of Consciousness (AVPU) alert   All Lung Fields Breath Sounds clear   PRE-TX-O2   O2 Device (Oxygen Therapy) nasal cannula   Flow (L/min) 2   SpO2 95 %   Pulse Oximetry Type Intermittent   $ Pulse Oximetry - Single Charge Pulse Oximetry - Single   Pulse 65   Resp 18   Aerosol Therapy   $ Aerosol Therapy Charges PRN treatment not required   Inhaler   $ Inhaler Charges MDI (Metered Dose Inahler) Treatment   Daily Review of Necessity (Inhaler) completed   Respiratory Treatment Status (Inhaler) given;mouth rinsed post treatment   Treatment Route (Inhaler) mouthpiece   Patient Position (Inhaler) semi-Gunderson's   Signs of Intolerance (Inhaler) none

## 2019-07-31 NOTE — PT/OT/SLP PROGRESS
Occupational Therapy      Patient Name:  Ruth Roach   MRN:  [id number]    Patient not seen today secondary to working with PT when OT attempted in AM.  OT unable to return for second attempt.  Will follow up.    Yann Pond OT  7/31/2019

## 2019-07-31 NOTE — PLAN OF CARE
"   07/31/19 1636   Discharge Reassessment   Assessment Type Discharge Planning Reassessment   Post-Acute Status   Discharge Delays (!) Patient and Family Barriers   SPOKE TO THE PATIENT'S DTR IN LAW, BETO ESPARZA, REGARDING DC PLANNING.  SHE REQUESTED THAT I GIVE HER THE OPTIONS AGAIN AS IT RELATES TO THIS PATIENT'S OPTIONS BECAUSE THE FAMILY IS STILL NOT SURE IF IT WILL BE HOME OR SNF.  BETO DID STATE THAT THE MD AND THE THERAPIST BOTH STATED THAT THE PATIENT CANNOT GO HOME ALONE.  I TOLD PAT THAT I AGREED WITH THEIR RECOMMENDATIONS.  I PROVIDED PAT WITH THE FOLLOWING OPTIONS:  SNF OR HOME WITH 24/7 SITTERS AND HOME HEALTH.  PAT ASKED IF SHE WENT TO AN ASSISTED LIVING FACILITY, COULD SHE STILL GET HOME HEALTH AND THERAPY AND I TOLD HER "YES".  THIS CM PROVIDED PAT WITH THE [facility] SNF PROVIDER LISTING FOR THE [facility]; I EXPLAINED THAT THE SOONER I COULD GET A FINAL DECISION FOR DISCHARGE, THE BETTER.  BETO ACKNOWLEDGED UNDERSTANDING AND STATED SHE WOULD SPEAK WITH HER  TONIGHT AND HOPEFULLY HAVE AN ANSWER TOMORROW.  THIS CM ACKNOWLEDGED UNDERSTANDING.  WILL FOLLOW UP TOMORROW.   "

## 2019-07-31 NOTE — PLAN OF CARE
Problem: Physical Therapy Goal  Goal: Physical Therapy Goal  Goals to be met by: 19     Patient will increase functional independence with mobility by performin. Supine to sit with Stand-by Assistance  2. Sit to stand transfer with Contact Guard Assistance  3. Bed to chair transfer with Contact Guard Assistance using Rolling Walker  4. Gait  x 40 feet with Contact Guard Assistance using Rolling Walker.      Outcome: Ongoing (interventions implemented as appropriate)  Pt continues to require assistance and max verbal cueing during all mobility and transfers

## 2019-07-31 NOTE — PLAN OF CARE
Problem: Adult Inpatient Plan of Care  Goal: Plan of Care Review  No acute events throughout the day, VS and assessment per flow sheet, patient progressing towards goals as tolerated, plan of care reviewed with Ruth Roach, all concerns addressed, will continue to monitor.

## 2019-07-31 NOTE — PLAN OF CARE
"   07/31/19 1650   Discharge Reassessment   Assessment Type Discharge Planning Reassessment   Post-Acute Status   Discharge Delays (!) Other   SPOKE TO THE PATIENT'S DTR IN LAW, BETO ESPARZA, REGARDING DC PLANNING.  SHE REQUESTED THAT I GIVE HER THE OPTIONS AGAIN AS IT RELATES TO THIS PATIENT'S OPTIONS BECAUSE THE FAMILY IS STILL NOT SURE IF IT WILL BE HOME OR SNF.  BETO DID STATE THAT THE MD AND THE THERAPIST BOTH STATED THAT THE PATIENT CANNOT GO HOME ALONE.  I TOLD PAT THAT I AGREED WITH THEIR RECOMMENDATIONS.  I PROVIDED PAT WITH THE FOLLOWING OPTIONS:  SNF OR HOME WITH 24/7 SITTERS AND HOME HEALTH.  PAT ASKED IF SHE WENT TO AN ASSISTED LIVING FACILITY, COULD SHE STILL GET HOME HEALTH AND THERAPY AND I TOLD HER "YES".  THIS CM PROVIDED PAT WITH THE [facility] SNF PROVIDER LISTING FOR THE [facility]; I EXPLAINED THAT THE SOONER I COULD GET A FINAL DECISION FOR DISCHARGE, THE BETTER.  BETO ACKNOWLEDGED UNDERSTANDING AND STATED SHE WOULD SPEAK WITH HER  TONIGHT AND HOPEFULLY HAVE AN ANSWER TOMORROW.  THIS CM ACKNOWLEDGED UNDERSTANDING.  WILL FOLLOW UP TOMORROW.   "

## 2019-07-31 NOTE — PROGRESS NOTES
[facility] Medicine  Progress Note    Patient Name: Ruth Roach  MRN: [id number]  Patient Class: OP- Observation   Admission Date: 7/28/2019  Length of Stay: 0 days  Attending Physician: Evens Downs MD  Primary Care Provider: Shannon Romo MD        Subjective:     Principal Problem:Acute cystitis    Interval History:  Likely baseline dementia, no acute events overnight, discussed with family, for placement,    Review of Systems  Objective:     Vital Signs (Most Recent):  Temp: 98.5 °F (36.9 °C) (07/31/19 1650)  Pulse: 95 (07/31/19 1650)  Resp: 18 (07/31/19 1650)  BP: 119/64 (07/31/19 1650)  SpO2: 98 % (07/31/19 1650) Vital Signs (24h Range):  Temp:  [98.1 °F (36.7 °C)-98.9 °F (37.2 °C)] 98.5 °F (36.9 °C)  Pulse:  [] 95  Resp:  [16-18] 18  SpO2:  [90 %-98 %] 98 %  BP: (107-182)/(64-88) 119/64     Weight: 56.7 kg (125 lb)  Body mass index is 20.8 kg/m².    Intake/Output Summary (Last 24 hours) at 7/31/2019 1856  Last data filed at 7/31/2019 1130  Gross per 24 hour   Intake 540 ml   Output 3300 ml   Net -2760 ml      Physical Exam  General, no acute distress, confused, likely baseline dementia today, improved compared to yesterday  Lungs, clear to auscultation bilaterally  Heart regular rhythm and rate  Abdomen, soft nontender nondistended  Extremities, no calf tenderness no edema        Significant Labs:   BMP:   Recent Labs   Lab 07/31/19  0523     100     140   K 3.7  3.7     100   CO2 34*  34*   BUN 11  11   CREATININE 0.6  0.6   CALCIUM 8.7  8.7     CBC:   Recent Labs   Lab 07/30/19  0629 07/31/19  0522   WBC 8.50 8.09  8.09   HGB 12.5 12.7  12.7   HCT 40.2 39.6  39.6    255  255           Assessment/Plan:      Active Diagnoses:    Diagnosis Date Noted POA    PRINCIPAL PROBLEM:  Acute cystitis [N30.00] 07/28/2019 Unknown    Delirium [R41.0] 07/28/2019 Unknown    Cystitis [N30.90] 07/28/2019 Yes      Problems Resolved During this  Admission:     VTE Risk Mitigation (From admission, onward)        Ordered     IP VTE LOW RISK PATIENT  Once      07/28/19 6752           Acute metabolic encephalopathy/delirium-most like secondary to urinary tract infection  Acute delirium seems to be resolved  patient likely at baseline dementia  Will need placement, discussed with patient's daughter today  Continue ceftriaxone IVPB  - continue Fall precautions  -continue PT /OT eval and treat     Urinary tract infection  UCx pending, negative so far  -Calderon catheter  -IV antibiotics with Rocephin     Falls  -PT /OT eval and treat  -Fall precautions  - Radiographic imaging with no fractures or acute abnormalities     Essential hypertension- moderate control  -Continue home medications     History of atrial fibrillation-rate controlled  -continue aspirin     History of COPD-stable  -Continue home medications      Evens Downs MD  Department of Hospital Medicine   [facility]

## 2019-07-31 NOTE — PT/OT/SLP PROGRESS
Physical Therapy Treatment    Patient Name:  Ruth Roach   MRN:  [id number]    Recommendations:     Discharge Recommendations:  nursing facility, skilled   Discharge Equipment Recommendations: commode, shower chair   Barriers to discharge: Decreased caregiver support    Assessment:     Ruth Roach is a 89 y.o. female admitted with a medical diagnosis of Acute cystitis.  She presents with the following impairments/functional limitations:  weakness, impaired endurance, pain. Pt continues with confusion and decreased ability to follow commands during tx today. Pt with decreased safety awareness and insight. Pt would not be safe to return home without 24/7 caregiver support.    Rehab Prognosis: Fair; patient would benefit from acute skilled PT services to address these deficits and reach maximum level of function.    Recent Surgery: * No surgery found *      Plan:     During this hospitalization, patient to be seen 6 x/week to address the identified rehab impairments via gait training, therapeutic activities, therapeutic exercises and progress toward the following goals:    · Plan of Care Expires:  07/29/19    Subjective     Chief Complaint: Pt without complaints  Patient/Family Comments/goals:   Pain/Comfort:  · Pain Rating 1: 0/10  · Pain Rating Post-Intervention 1: 0/10      Objective:     Communicated with nurse prior to session.  Patient found supine with bed alarm, judge catheter, oxygen upon PT entry to room.     General Precautions: Standard, fall   Orthopedic Precautions:N/A   Braces:       Functional Mobility:  · Bed Mobility:     · Supine to Sit: minimum assistance  · Transfers:     · Sit to Stand:  moderate assistance with rolling walker  · Bed to Chair: minimum assistance with  rolling walker  using  Step Transfer  · Toilet Transfer: minimum assistance with  rolling walker  using  Stand Pivot and verbal cues for sequencing and hand placement with transfer  · Gait: few steps to chair with RW and min assist        AM-PAC 6 CLICK MOBILITY  Turning over in bed (including adjusting bedclothes, sheets and blankets)?: 2  Sitting down on and standing up from a chair with arms (e.g., wheelchair, bedside commode, etc.): 2  Moving from lying on back to sitting on the side of the bed?: 2  Moving to and from a bed to a chair (including a wheelchair)?: 3  Need to walk in hospital room?: 3  Climbing 3-5 steps with a railing?: 1  Basic Mobility Total Score: 13       Therapeutic Activities and Exercises:   bed mobility; sitting EOB for trunk control and midline orientation; sit <> stands; transfer training      Patient left up in chair with all lines intact, call button in reach, chair alarm on and daughter in law present..    GOALS:   Multidisciplinary Problems     Physical Therapy Goals        Problem: Physical Therapy Goal    Goal Priority Disciplines Outcome Goal Variances Interventions   Physical Therapy Goal     PT, PT/OT Ongoing (interventions implemented as appropriate)     Description:  Goals to be met by: 19     Patient will increase functional independence with mobility by performin. Supine to sit with Stand-by Assistance  2. Sit to stand transfer with Contact Guard Assistance  3. Bed to chair transfer with Contact Guard Assistance using Rolling Walker  4. Gait  x 40 feet with Contact Guard Assistance using Rolling Walker.                       Time Tracking:     PT Received On: 19  PT Start Time: 1019     PT Stop Time: 1057  PT Total Time (min): 38 min     Billable Minutes: Gait Training 15 minutes and Therapeutic Activity 23 minutes    Treatment Type: Treatment  PT/PTA: PTA     PTA Visit Number: 2     Homa Riaz, PTA  2019

## 2019-08-01 ENCOUNTER — TELEPHONE (OUTPATIENT)
Dept: FAMILY MEDICINE | Facility: CLINIC | Age: 84
End: 2019-08-01

## 2019-08-01 LAB
ANION GAP SERPL CALC-SCNC: 6 MMOL/L (ref 8–16)
ANION GAP SERPL CALC-SCNC: 6 MMOL/L (ref 8–16)
BASOPHILS # BLD AUTO: 0.04 K/UL (ref 0–0.2)
BASOPHILS # BLD AUTO: 0.04 K/UL (ref 0–0.2)
BASOPHILS NFR BLD: 0.6 % (ref 0–1.9)
BASOPHILS NFR BLD: 0.6 % (ref 0–1.9)
BUN SERPL-MCNC: 10 MG/DL (ref 8–23)
BUN SERPL-MCNC: 10 MG/DL (ref 8–23)
CALCIUM SERPL-MCNC: 9 MG/DL (ref 8.7–10.5)
CALCIUM SERPL-MCNC: 9 MG/DL (ref 8.7–10.5)
CHLORIDE SERPL-SCNC: 102 MMOL/L (ref 95–110)
CHLORIDE SERPL-SCNC: 102 MMOL/L (ref 95–110)
CO2 SERPL-SCNC: 36 MMOL/L (ref 23–29)
CO2 SERPL-SCNC: 36 MMOL/L (ref 23–29)
CREAT SERPL-MCNC: 0.5 MG/DL (ref 0.5–1.4)
CREAT SERPL-MCNC: 0.5 MG/DL (ref 0.5–1.4)
DIFFERENTIAL METHOD: ABNORMAL
DIFFERENTIAL METHOD: ABNORMAL
EOSINOPHIL # BLD AUTO: 0.5 K/UL (ref 0–0.5)
EOSINOPHIL # BLD AUTO: 0.5 K/UL (ref 0–0.5)
EOSINOPHIL NFR BLD: 7.6 % (ref 0–8)
EOSINOPHIL NFR BLD: 7.6 % (ref 0–8)
ERYTHROCYTE [DISTWIDTH] IN BLOOD BY AUTOMATED COUNT: 12.9 % (ref 11.5–14.5)
ERYTHROCYTE [DISTWIDTH] IN BLOOD BY AUTOMATED COUNT: 12.9 % (ref 11.5–14.5)
EST. GFR  (AFRICAN AMERICAN): >60 ML/MIN/1.73 M^2
EST. GFR  (AFRICAN AMERICAN): >60 ML/MIN/1.73 M^2
EST. GFR  (NON AFRICAN AMERICAN): >60 ML/MIN/1.73 M^2
EST. GFR  (NON AFRICAN AMERICAN): >60 ML/MIN/1.73 M^2
GLUCOSE SERPL-MCNC: 96 MG/DL (ref 70–110)
GLUCOSE SERPL-MCNC: 96 MG/DL (ref 70–110)
HCT VFR BLD AUTO: 39.8 % (ref 37–48.5)
HCT VFR BLD AUTO: 39.8 % (ref 37–48.5)
HGB BLD-MCNC: 12.7 G/DL (ref 12–16)
HGB BLD-MCNC: 12.7 G/DL (ref 12–16)
IMM GRANULOCYTES # BLD AUTO: 0.02 K/UL (ref 0–0.04)
IMM GRANULOCYTES # BLD AUTO: 0.02 K/UL (ref 0–0.04)
IMM GRANULOCYTES NFR BLD AUTO: 0.3 % (ref 0–0.5)
IMM GRANULOCYTES NFR BLD AUTO: 0.3 % (ref 0–0.5)
LYMPHOCYTES # BLD AUTO: 1.7 K/UL (ref 1–4.8)
LYMPHOCYTES # BLD AUTO: 1.7 K/UL (ref 1–4.8)
LYMPHOCYTES NFR BLD: 24.9 % (ref 18–48)
LYMPHOCYTES NFR BLD: 24.9 % (ref 18–48)
MCH RBC QN AUTO: 32.9 PG (ref 27–31)
MCH RBC QN AUTO: 32.9 PG (ref 27–31)
MCHC RBC AUTO-ENTMCNC: 31.9 G/DL (ref 32–36)
MCHC RBC AUTO-ENTMCNC: 31.9 G/DL (ref 32–36)
MCV RBC AUTO: 103 FL (ref 82–98)
MCV RBC AUTO: 103 FL (ref 82–98)
MONOCYTES # BLD AUTO: 0.8 K/UL (ref 0.3–1)
MONOCYTES # BLD AUTO: 0.8 K/UL (ref 0.3–1)
MONOCYTES NFR BLD: 11.9 % (ref 4–15)
MONOCYTES NFR BLD: 11.9 % (ref 4–15)
NEUTROPHILS # BLD AUTO: 3.8 K/UL (ref 1.8–7.7)
NEUTROPHILS # BLD AUTO: 3.8 K/UL (ref 1.8–7.7)
NEUTROPHILS NFR BLD: 54.7 % (ref 38–73)
NEUTROPHILS NFR BLD: 54.7 % (ref 38–73)
NRBC BLD-RTO: 0 /100 WBC
NRBC BLD-RTO: 0 /100 WBC
PLATELET # BLD AUTO: 273 K/UL (ref 150–350)
PLATELET # BLD AUTO: 273 K/UL (ref 150–350)
PMV BLD AUTO: 11.5 FL (ref 9.2–12.9)
PMV BLD AUTO: 11.5 FL (ref 9.2–12.9)
POTASSIUM SERPL-SCNC: 3.8 MMOL/L (ref 3.5–5.1)
POTASSIUM SERPL-SCNC: 3.8 MMOL/L (ref 3.5–5.1)
RBC # BLD AUTO: 3.86 M/UL (ref 4–5.4)
RBC # BLD AUTO: 3.86 M/UL (ref 4–5.4)
SODIUM SERPL-SCNC: 144 MMOL/L (ref 136–145)
SODIUM SERPL-SCNC: 144 MMOL/L (ref 136–145)
WBC # BLD AUTO: 6.88 K/UL (ref 3.9–12.7)
WBC # BLD AUTO: 6.88 K/UL (ref 3.9–12.7)

## 2019-08-01 PROCEDURE — 97110 THERAPEUTIC EXERCISES: CPT

## 2019-08-01 PROCEDURE — 85025 COMPLETE CBC W/AUTO DIFF WBC: CPT

## 2019-08-01 PROCEDURE — G0378 HOSPITAL OBSERVATION PER HR: HCPCS

## 2019-08-01 PROCEDURE — 99900035 HC TECH TIME PER 15 MIN (STAT)

## 2019-08-01 PROCEDURE — 80048 BASIC METABOLIC PNL TOTAL CA: CPT

## 2019-08-01 PROCEDURE — 30200315 PPD INTRADERMAL TEST REV CODE 302: Performed by: INTERNAL MEDICINE

## 2019-08-01 PROCEDURE — 97530 THERAPEUTIC ACTIVITIES: CPT

## 2019-08-01 PROCEDURE — 86580 TB INTRADERMAL TEST: CPT | Performed by: INTERNAL MEDICINE

## 2019-08-01 PROCEDURE — 94761 N-INVAS EAR/PLS OXIMETRY MLT: CPT

## 2019-08-01 PROCEDURE — 25000003 PHARM REV CODE 250: Performed by: NURSE PRACTITIONER

## 2019-08-01 PROCEDURE — 97116 GAIT TRAINING THERAPY: CPT

## 2019-08-01 RX ADMIN — TRAMADOL HYDROCHLORIDE 50 MG: 50 TABLET, COATED ORAL at 06:08

## 2019-08-01 RX ADMIN — ACETAMINOPHEN 650 MG: 325 TABLET ORAL at 10:08

## 2019-08-01 RX ADMIN — PANTOPRAZOLE SODIUM 40 MG: 40 TABLET, DELAYED RELEASE ORAL at 08:08

## 2019-08-01 RX ADMIN — AZELASTINE 137 MCG: 137 SPRAY, METERED NASAL at 08:08

## 2019-08-01 RX ADMIN — LEVOTHYROXINE SODIUM 50 MCG: 25 TABLET ORAL at 05:08

## 2019-08-01 RX ADMIN — OXYCODONE HYDROCHLORIDE AND ACETAMINOPHEN 500 MG: 500 TABLET ORAL at 08:08

## 2019-08-01 RX ADMIN — FLUTICASONE FUROATE AND VILANTEROL TRIFENATATE 1 PUFF: 200; 25 POWDER RESPIRATORY (INHALATION) at 08:08

## 2019-08-01 RX ADMIN — TUBERCULIN PURIFIED PROTEIN DERIVATIVE 5 UNITS: 5 INJECTION, SOLUTION INTRADERMAL at 02:08

## 2019-08-01 NOTE — PLAN OF CARE
08/01/19 1727   Discharge Reassessment   Assessment Type Discharge Planning Reassessment   Anticipated Discharge Disposition SNF   SPOKE WITH THE PATIENT AND FAMILY REGARDING NURSING HOME PLACEMENT.  [facility] PROVIDER LISTING WAS PROVIDED TO PATIENT AND FAMILY.  AFTER SOME DISCUSSION AND REVIEW OF THE LIST, THE FOLLOWING PREFERENCE WAS GIVEN FOR NH PLACEMENT:  (1) [facility]; (2) [facility] [place]; (3) [facility] [place].  THE PATIENT CHOICE FORM WAS COMPLETED AND SIGNED.    THE ANTICIPATED DISCHARGE DISPOSITION IS NURSING HOME PLACEMENT AT THIS TIME, NOT SNF.    NURSING HOME PLACEMENT REFERRAL WAS ELECTRONICALLY SUBMITTED THROUGH [employer] TO [facility], [facility], AND [facility].  AWAIT RESPONSE.

## 2019-08-01 NOTE — PLAN OF CARE
Problem: Oral Intake Inadequate  Goal: Improved Oral Intake  Outcome: Ongoing (interventions implemented as appropriate)  Patient encourage to eat while in the room with her. Patient's family as brought Boost for her to drink as a supplement

## 2019-08-01 NOTE — PLAN OF CARE
Problem: Physical Therapy Goal  Goal: Physical Therapy Goal  Goals to be met by: 19     Patient will increase functional independence with mobility by performin. Supine to sit with Stand-by Assistance  2. Sit to stand transfer with Contact Guard Assistance  3. Bed to chair transfer with Contact Guard Assistance using Rolling Walker  4. Gait  x 40 feet with Contact Guard Assistance using Rolling Walker.      Outcome: Outcome(s) achieved Date Met: 19  Pt reached goals this AM during PT tx. Will speak with therapist regarding goals being met.

## 2019-08-01 NOTE — PLAN OF CARE
Problem: Physical Therapy Goal  Goal: Physical Therapy Goal  Goals to be met by: 19     Patient will increase functional independence with mobility by performin. Supine to sit with Stand-by Assistance  2. Sit to stand transfer with Contact Guard Assistance  3. Bed to chair transfer with Contact Guard Assistance using Rolling Walker  4. Gait  x 40 feet with Contact Guard Assistance using Rolling Walker.       Outcome: Ongoing (interventions implemented as appropriate)  Pt still requires skilled therapy services to achieve goals

## 2019-08-01 NOTE — PT/OT/SLP PROGRESS
"Occupational Therapy   Treatment    Name: Ruth Roach  MRN: [id number]  Admitting Diagnosis:  Acute cystitis       Recommendations:     Discharge Recommendations: nursing facility, skilled  Discharge Equipment Recommendations:  commode, shower chair  Barriers to discharge:  Decreased caregiver support    Assessment:     Ruth Roach is a 89 y.o. female with a medical diagnosis of Acute cystitis.  She presents with c/o R knee/hip pain today and requesting assistance back to bed.  She is pleasant/cooperative and following commands, but requires extra cueing due to cognitive deficits.  Activity tolerance is limited. Performance deficits affecting function are weakness, impaired endurance, pain, impaired cognition, impaired self care skills, impaired functional mobilty, impaired balance, gait instability.     Rehab Prognosis:  Good; patient would benefit from acute skilled OT services to address these deficits and reach maximum level of function.       Plan:     Patient to be seen 5 x/week to address the above listed problems via self-care/home management, therapeutic activities, therapeutic exercises  · Plan of Care Expires: 07/29/19  · Plan of Care Reviewed with: patient, family    Subjective     Pain/Comfort:  · Pain Rating 1: (pt reported the pain in her RLE was "a lot")  · Pain Rating Post-Intervention 1: (once in supine, pt reported pain was "better")    Objective:     Communicated with: RN prior to session.  Patient found up in chair with oxygen upon OT entry to room.    General Precautions: Standard, fall   Orthopedic Precautions:N/A   Braces: N/A     Occupational Performance:     Bed Mobility:    · Patient completed Sit to Supine with stand by assistance     Functional Mobility/Transfers:  · Patient completed Sit <> Stand Transfer with minimum assistance  with  rolling walker   · Patient completed Bed <> Chair Transfer using Stand Pivot technique with minimum assistance with rolling walker  · Functional " Mobility: Pt took 3 side steps at EOB with Minimal HHA    Activities of Daily Living:  · Not assessed today; pt fatigued/in pain and requesting to rest    [facility] 6 Click ADL: 15    Treatment & Education:  Safe transfer technique using RW reviewed with pt/family    Patient left supine with all lines intact, call button in reach, bed alarm on and family presentEducation:      GOALS:   Multidisciplinary Problems     Occupational Therapy Goals        Problem: Occupational Therapy Goal    Goal Priority Disciplines Outcome Interventions   Occupational Therapy Goal     OT, PT/OT     Description:  Goals to be met by: discharge    Patient will increase functional independence with ADLs by performing:    UE Dressing with Set-up Assistance.  LE Dressing with Moderate Assistance.  Grooming while standing with Contact Guard Assistance.  Toileting from bedside commode with Minimal Assistance for hygiene and clothing management.   Supine to sit with Contact Guard Assistance.  Toilet transfer to bedside commode with Minimal Assistance.- Met on 7/30  Toilet transfer to bedside commode with Stand-by assistance and minimal verbal cues for safe technique- NEW GOAL                       Time Tracking:     OT Date of Treatment: 08/01/19  OT Start Time: 1130  OT Stop Time: 1144  OT Total Time (min): 14 min    Billable Minutes:Therapeutic Activity 14    Yann Pond OT  8/1/2019

## 2019-08-01 NOTE — PT/OT/SLP PROGRESS
Physical Therapy Treatment    Patient Name:  Ruth Roach   MRN:  [id number]    Recommendations:     Discharge Recommendations:  nursing facility, skilled   Discharge Equipment Recommendations: commode, shower chair   Barriers to discharge: Decreased caregiver support    Assessment:     Ruth Roach is a 89 y.o. female admitted with a medical diagnosis of Acute cystitis.  She presents with the following impairments/functional limitations:  weakness, impaired endurance, pain. Pt remains unsafe due to poor insight and safety awareness. Pt appeared confused throughout tx, unable to complete sentences and find the right words. Pt often loses train of thought while completing activity requiring max cues to stay on task. Pt's daughter in law present at end of tx.    Rehab Prognosis: Fair; patient would benefit from acute skilled PT services to address these deficits and reach maximum level of function.    Recent Surgery: * No surgery found *      Plan:     During this hospitalization, patient to be seen 6 x/week to address the identified rehab impairments via gait training, therapeutic activities, therapeutic exercises and progress toward the following goals:    · Plan of Care Expires:  07/29/19    Subjective     Chief Complaint: Pt c/o pain in R knee and back of head. Nurse notified of both.  Patient/Family Comments/goals: Pt's daughter in law reports that they are looking into SNF placement.  Pain/Comfort:  · Location - Side 1: Left  · Location 1: knee  · Pain Addressed 1: Reposition, Distraction, Nurse notified  · Pain Rating Post-Intervention 1: 4/10  · Pain Rating 2: 3/10  · Location - Orientation 2: posterior  · Location 2: head  · Pain Addressed 2: Nurse notified  · Pain Rating Post-Intervention 2: 3/10      Objective:     Communicated with nurse prior to session.  Patient found supine with bed alarm, jugde catheter, oxygen upon PT entry to room.     General Precautions: Standard, fall   Orthopedic Precautions:N/A    Braces: N/A     Functional Mobility:  · Bed Mobility:     · Supine to Sit: moderate assistance due to R knee pain  · Transfers:     · Sit to Stand:  moderate assistance with rolling walker  · Bed to Chair: moderate assistance with  rolling walker  using  Step Transfer  · Toilet Transfer: moderate assistance with  rolling walker  using  Stand Pivot. Pt required verbal cues for sequencing and hand placement with transfer  · Gait: steps to chair with RW and mod assist for balance and verbal cues required for sequencing       AM-PAC 6 CLICK MOBILITY  Turning over in bed (including adjusting bedclothes, sheets and blankets)?: 2  Sitting down on and standing up from a chair with arms (e.g., wheelchair, bedside commode, etc.): 2  Moving from lying on back to sitting on the side of the bed?: 2  Moving to and from a bed to a chair (including a wheelchair)?: 2  Need to walk in hospital room?: 2  Climbing 3-5 steps with a railing?: 1  Basic Mobility Total Score: 11       Therapeutic Activities and Exercises:   bed mobility; sitting EOB for trunk control and midline orientation; sit <> stands; transfer training       Patient left up in chair with all lines intact, call button in reach, chair alarm on, nurse notified and family and nurse extender present..    GOALS:   Multidisciplinary Problems     Physical Therapy Goals        Problem: Physical Therapy Goal    Goal Priority Disciplines Outcome Goal Variances Interventions   Physical Therapy Goal     PT, PT/OT Ongoing (interventions implemented as appropriate)     Description:  Goals to be met by: 19     Patient will increase functional independence with mobility by performin. Supine to sit with Stand-by Assistance  2. Sit to stand transfer with Contact Guard Assistance  3. Bed to chair transfer with Contact Guard Assistance using Rolling Walker  4. Gait  x 40 feet with Contact Guard Assistance using Rolling Walker.                        Time Tracking:     PT  Received On: 08/01/19  PT Start Time: 0929     PT Stop Time: 1022  PT Total Time (min): 53 min     Billable Minutes: Gait Training 15 minutes and Therapeutic Activity 38 minutes    Treatment Type: Treatment  PT/PTA: PTA     PTA Visit Number: 3     Homa Mello PTA  08/01/2019

## 2019-08-01 NOTE — PLAN OF CARE
Problem: Skin Injury Risk Increased  Goal: Skin Health and Integrity  Outcome: Ongoing (interventions implemented as appropriate)  Turning patient every two hours. Up to chair during the day

## 2019-08-02 VITALS
HEIGHT: 65 IN | DIASTOLIC BLOOD PRESSURE: 57 MMHG | SYSTOLIC BLOOD PRESSURE: 113 MMHG | HEART RATE: 83 BPM | WEIGHT: 125 LBS | RESPIRATION RATE: 18 BRPM | TEMPERATURE: 98 F | OXYGEN SATURATION: 90 % | BODY MASS INDEX: 20.83 KG/M2
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
## 2019-08-02 LAB
ANION GAP SERPL CALC-SCNC: 6 MMOL/L (ref 8–16)
BACTERIA BLD CULT: NORMAL
BACTERIA BLD CULT: NORMAL
BASOPHILS # BLD AUTO: 0.03 K/UL (ref 0–0.2)
BASOPHILS NFR BLD: 0.5 % (ref 0–1.9)
BUN SERPL-MCNC: 14 MG/DL (ref 8–23)
CALCIUM SERPL-MCNC: 9 MG/DL (ref 8.7–10.5)
CHLORIDE SERPL-SCNC: 99 MMOL/L (ref 95–110)
CO2 SERPL-SCNC: 37 MMOL/L (ref 23–29)
CREAT SERPL-MCNC: 0.5 MG/DL (ref 0.5–1.4)
DIFFERENTIAL METHOD: ABNORMAL
EOSINOPHIL # BLD AUTO: 0.4 K/UL (ref 0–0.5)
EOSINOPHIL NFR BLD: 6.2 % (ref 0–8)
ERYTHROCYTE [DISTWIDTH] IN BLOOD BY AUTOMATED COUNT: 12.6 % (ref 11.5–14.5)
EST. GFR  (AFRICAN AMERICAN): >60 ML/MIN/1.73 M^2
EST. GFR  (NON AFRICAN AMERICAN): >60 ML/MIN/1.73 M^2
GLUCOSE SERPL-MCNC: 104 MG/DL (ref 70–110)
HCT VFR BLD AUTO: 41.9 % (ref 37–48.5)
HGB BLD-MCNC: 13.3 G/DL (ref 12–16)
IMM GRANULOCYTES # BLD AUTO: 0.02 K/UL (ref 0–0.04)
IMM GRANULOCYTES NFR BLD AUTO: 0.3 % (ref 0–0.5)
LYMPHOCYTES # BLD AUTO: 1.9 K/UL (ref 1–4.8)
LYMPHOCYTES NFR BLD: 28.3 % (ref 18–48)
MCH RBC QN AUTO: 32 PG (ref 27–31)
MCHC RBC AUTO-ENTMCNC: 31.7 G/DL (ref 32–36)
MCV RBC AUTO: 101 FL (ref 82–98)
MONOCYTES # BLD AUTO: 0.7 K/UL (ref 0.3–1)
MONOCYTES NFR BLD: 10.8 % (ref 4–15)
NEUTROPHILS # BLD AUTO: 3.6 K/UL (ref 1.8–7.7)
NEUTROPHILS NFR BLD: 53.9 % (ref 38–73)
NRBC BLD-RTO: 0 /100 WBC
PLATELET # BLD AUTO: 254 K/UL (ref 150–350)
PMV BLD AUTO: 11.5 FL (ref 9.2–12.9)
POTASSIUM SERPL-SCNC: 3.8 MMOL/L (ref 3.5–5.1)
RBC # BLD AUTO: 4.15 M/UL (ref 4–5.4)
SODIUM SERPL-SCNC: 142 MMOL/L (ref 136–145)
WBC # BLD AUTO: 6.64 K/UL (ref 3.9–12.7)

## 2019-08-02 PROCEDURE — 27000221 HC OXYGEN, UP TO 24 HOURS

## 2019-08-02 PROCEDURE — 97116 GAIT TRAINING THERAPY: CPT

## 2019-08-02 PROCEDURE — 85025 COMPLETE CBC W/AUTO DIFF WBC: CPT

## 2019-08-02 PROCEDURE — 80048 BASIC METABOLIC PNL TOTAL CA: CPT

## 2019-08-02 PROCEDURE — 25000003 PHARM REV CODE 250: Performed by: NURSE PRACTITIONER

## 2019-08-02 PROCEDURE — 94640 AIRWAY INHALATION TREATMENT: CPT

## 2019-08-02 PROCEDURE — 94761 N-INVAS EAR/PLS OXIMETRY MLT: CPT

## 2019-08-02 PROCEDURE — 12000002 HC ACUTE/MED SURGE SEMI-PRIVATE ROOM

## 2019-08-02 PROCEDURE — 97530 THERAPEUTIC ACTIVITIES: CPT

## 2019-08-02 RX ADMIN — FLUTICASONE FUROATE AND VILANTEROL TRIFENATATE 1 PUFF: 200; 25 POWDER RESPIRATORY (INHALATION) at 07:08

## 2019-08-02 RX ADMIN — AZELASTINE 137 MCG: 137 SPRAY, METERED NASAL at 09:08

## 2019-08-02 RX ADMIN — ACETAMINOPHEN 650 MG: 325 TABLET ORAL at 01:08

## 2019-08-02 RX ADMIN — OXYCODONE HYDROCHLORIDE AND ACETAMINOPHEN 500 MG: 500 TABLET ORAL at 09:08

## 2019-08-02 RX ADMIN — LEVOTHYROXINE SODIUM 50 MCG: 25 TABLET ORAL at 06:08

## 2019-08-02 RX ADMIN — PANTOPRAZOLE SODIUM 40 MG: 40 TABLET, DELAYED RELEASE ORAL at 09:08

## 2019-08-02 NOTE — PLAN OF CARE
08/02/19 1254   Discharge Reassessment   Assessment Type Discharge Planning Reassessment   Anticipated Discharge Disposition HospiceHome   SPOKE TO PATIENT'S DTR IN LAW, BETO.  PAT STATED THAT THEY HAD A FAMILY MEETING AND THE FAMILY HAS DECIDED ON HOME WITH HOSPICE, AND THEY WOULD LIKE TO HAVE [facility] HOS[facility].   THE PATIENT CHOICE FORM WAS COMPLETED AND SIGNED.    HOSPICE REFERRAL ELECTRONICALLY SUBMITTED THROUGH [employer] TO [facility].  THIS CM SPOKE TO NEL AND LACY WITH [facility] AND MADE THEM AWARE OF THE REFERRAL.  PER LACY, SHE WILL BE AT [facility] IN ABOUT 30 MINUTES.

## 2019-08-02 NOTE — PLAN OF CARE
08/02/19 1555   Discharge Reassessment   Assessment Type Final Discharge Note   Anticipated Discharge Disposition Home   THIS PATIENT DISCHARGED TO HOME WITH NO SERVICES.  THE PATIENT AND FAMILY ARE TO SPEAK WITH [facility] WHEN THEY GET HOME AND WILL DECIDE IF THEY WANT TO USE HOSPICE SERVICES; IF THEY DECIDE ON HOSPICE, THE PATIENT WILL BE ADMITTED TO HOSPICE FROM HER HOME, NOT FROM [facility].

## 2019-08-02 NOTE — PT/OT/SLP PROGRESS
Physical Therapy Treatment    Patient Name:  Ruth Roach   MRN:  [id number]    Recommendations:     Discharge Recommendations:  nursing facility, skilled, home(SNF vs home with 24/7 supervision)   Discharge Equipment Recommendations: commode, shower chair   Barriers to discharge: Decreased caregiver support    Assessment:     Ruth Roach is a 89 y.o. female admitted with a medical diagnosis of Acute cystitis.  She presents with the following impairments/functional limitations:  weakness, impaired endurance, impaired cognition, impaired self care skills, impaired functional mobilty, impaired balance, gait instability. Pt able to ambulate 10ft today which is an improvement. Pt with noted less R knee pain today.    Rehab Prognosis: Fair; patient would benefit from acute skilled PT services to address these deficits and reach maximum level of function.    Recent Surgery: * No surgery found *      Plan:     During this hospitalization, patient to be seen 6 x/week to address the identified rehab impairments via gait training, therapeutic activities, therapeutic exercises and progress toward the following goals:    · Plan of Care Expires:  07/29/19    Subjective     Chief Complaint: Pt c/o posterior head pain behind ear. Pts daughter in law states that pt is supposed to be having an xray for this issue.  Patient/Family Comments/goals:   Pain/Comfort:  · Pain Rating 1: 2/10  · Location - Side 1: Right  · Location - Orientation 1: posterior  · Location 1: head  · Pain Addressed 1: Reposition, Distraction  · Pain Rating Post-Intervention 1: 2/10      Objective:     Communicated with nurse prior to session.  Patient found supine with oxygen upon PT entry to room.     General Precautions: Standard, fall   Orthopedic Precautions:N/A   Braces: N/A     Functional Mobility:  · Bed Mobility:     · Supine to Sit: minimum assistance  · Transfers:     · Sit to Stand:  minimum assistance and of 2 persons with rolling walker  · Bed to  Chair: moderate assistance with  rolling walker  using  Step Transfer  · Gait: 10ft with RW and mod assist. W/c follow required and pt needed sitting rest break following 10ft of gait.      AM-PAC 6 CLICK MOBILITY  Turning over in bed (including adjusting bedclothes, sheets and blankets)?: 3  Sitting down on and standing up from a chair with arms (e.g., wheelchair, bedside commode, etc.): 2  Moving from lying on back to sitting on the side of the bed?: 3  Moving to and from a bed to a chair (including a wheelchair)?: 2  Need to walk in hospital room?: 2  Climbing 3-5 steps with a railing?: 1  Basic Mobility Total Score: 13       Therapeutic Activities and Exercises:   bed mobility; sitting EOB for trunk control and midline orientation; sit <> stands; transfer training      Patient left up in chair with all lines intact, call button in reach, chair alarm on and daughter in law present..    GOALS:   Multidisciplinary Problems     Physical Therapy Goals        Problem: Physical Therapy Goal    Goal Priority Disciplines Outcome Goal Variances Interventions   Physical Therapy Goal     PT, PT/OT Ongoing (interventions implemented as appropriate)     Description:  Goals to be met by: 19     Patient will increase functional independence with mobility by performin. Supine to sit with Stand-by Assistance  2. Sit to stand transfer with Contact Guard Assistance  3. Bed to chair transfer with Contact Guard Assistance using Rolling Walker  4. Gait  x 40 feet with Contact Guard Assistance using Rolling Walker.                        Time Tracking:     PT Received On: 19  PT Start Time: 0945     PT Stop Time: 1009  PT Total Time (min): 24 min     Billable Minutes: Gait Training 12 minutes and Therapeutic Activity 12 minutes    Treatment Type: Treatment  PT/PTA: PTA     PTA Visit Number: 4     Homa Mello PTA  2019

## 2019-08-02 NOTE — PT/OT/SLP PROGRESS
Occupational Therapy      Patient Name:  Ruth Roach   MRN:  [id number]    OT checked on pt at ~1315 but pt was just assisted back to bed by nursing staff to rest.      Yann Pond, OT  8/2/2019

## 2019-08-02 NOTE — PLAN OF CARE
08/02/19 1130   PRE-TX-O2   O2 Device (Oxygen Therapy) nasal cannula   Flow (L/min) 2   SpO2 (!) 90 %   Pulse 83   Resp 18   Temp 98.2 °F (36.8 °C)   BP (!) 113/57

## 2019-08-02 NOTE — PLAN OF CARE
Problem: Physical Therapy Goal  Goal: Physical Therapy Goal  Goals to be met by: 19     Patient will increase functional independence with mobility by performin. Supine to sit with Stand-by Assistance  2. Sit to stand transfer with Contact Guard Assistance  3. Bed to chair transfer with Contact Guard Assistance using Rolling Walker  4. Gait  x 40 feet with Contact Guard Assistance using Rolling Walker.       Outcome: Ongoing (interventions implemented as appropriate)  Pt progressing towards goals

## 2019-08-02 NOTE — PLAN OF CARE
08/02/19 1002   Discharge Reassessment   Assessment Type Discharge Planning Reassessment   Anticipated Discharge Disposition SNF   LOCET CALLED IN TO THE [facility]; SPOKE WITH GIANFRANCO AT THE DEPARTMENT OF LONG TERM CARE.    EVERARDO MANUALLY FAXED TO [facility]/OFFICE OF AGING.

## 2019-08-02 NOTE — PLAN OF CARE
Problem: Fall Injury Risk  Goal: Absence of Fall and Fall-Related Injury  Outcome: Ongoing (interventions implemented as appropriate)  Bed alarm in use. Call light within reach

## 2019-08-02 NOTE — DISCHARGE SUMMARY
On license of [facility]  Discharge Summary     Patient ID:  Ruth Roach  [id number]  89 y.o.  4/2/1929    Admit date: 7/28/2019    Discharge Date and Time:  08/02/2019 2:37 PM    Admitting Physician: Nina Flores MD     Discharge Provider: Evens Downs    Reason for Admission: Cystitis [N30.90]  Fall [W19.XXXA]    Admission Condition: fair    Hospital Course  The patient was admitted for acute confusion, delirium, for presumed acute cholecystitis.  However even sure the urine cultures were negative.  She was treated with ceftriaxone IV piggyback.  Eventually she recovered, she reversed to her baseline dementia.  Initially the family was interested in nursing home placement, however along the course the day I asked about hospice, and I told them that my opinion the patient does not meet criteria for hospice because I do not see any terminal illness for her in the next 6 months.  Patient with dementia can leave longer than that.  The family decided to take the patient home and have hospice evaluate her at home.  I discussed extensively the discharge from the family, the , and case coordinator, the was not understanding and agreement.  Discharge time 40 min.            Final Diagnoses:    Principal Problem: Delirium   Secondary Diagnoses: acute cystitis    Discharged Condition: fair    Discharge Exam:  General, no acute distress, baseline dementia  Lungs, clear to auscultation bilaterally  Cardiac regular rhythm, no  Abdomen soft, nontender,  Extremities no edema no tenderness    Disposition: Home or Self Care    Follow Up/Patient Instructions:     Medications:  Reconciled Home Medications:      Medication List      CONTINUE taking these medications    albuterol-ipratropium 2.5 mg-0.5 mg/3 mL nebulizer solution  Commonly known as:  DUO-NEB  Inhale into the lungs.     azelastine 137 mcg (0.1 %) nasal spray  Commonly known as:  ASTELIN  1 spray (137 mcg total) by Nasal route 2 (two) times daily.      fluticasone furoate-vilanterol 200-25 mcg/dose Dsdv diskus inhaler  Commonly known as:  BREO ELLIPTA  Inhale 1 puff into the lungs once daily.     ginseng 100 mg Cap  Take 1 tablet by mouth daily as needed.     levothyroxine 50 MCG tablet  Commonly known as:  SYNTHROID  Take 1 tablet (50 mcg total) by mouth once daily.     loperamide 2 mg capsule  Commonly known as:  IMODIUM  Take 2 mg by mouth 4 (four) times daily as needed.     methylphenidate HCl 20 MG tablet  Commonly known as:  RITALIN  Take 1 tablet (20 mg total) by mouth 3 (three) times daily with meals.     multivitamin capsule  Take 1 capsule by mouth once daily.     traMADol 50 mg tablet  Commonly known as:  ULTRAM  Take 1 tablet (50 mg total) by mouth every 12 (twelve) hours as needed for Pain.     VITAMIN C 500 MG tablet  Generic drug:  ascorbic acid (vitamin C)  Take 500 mg by mouth once daily.          Discharge Procedure Orders   Diet Adult Regular     Activity as tolerated   Order Comments: Fall risks, she needs supervision. Family wants her in hospice     Follow-up Information     Follow up In 3 days.    Why:  hospice               Activity: ambulate in house with assistance  Diet: regular diet      Follow-up with PCP in a few days, f/o hospice ay home, as per family wishes.    Signed:  Evens Downs  8/2/2019  2:37 PM

## 2019-08-02 NOTE — PROGRESS NOTES
[facility] Medicine  Progress Note    Patient Name: uRth Roach  MRN: [id number]  Patient Class: OP- Observation   Admission Date: 7/28/2019  Length of Stay: 0 days  Attending Physician: Evens Downs MD  Primary Care Provider: Shannon Romo MD        Subjective:     Principal Problem:Acute cystitis    Interval History:  No change since yesterday, pending placement    Review of Systems  Objective:     Vital Signs (Most Recent):  Temp: 98.1 °F (36.7 °C) (08/01/19 1617)  Pulse: 80 (08/01/19 1617)  Resp: 20 (08/01/19 1617)  BP: 135/71 (08/01/19 1617)  SpO2: 97 % (08/01/19 1617) Vital Signs (24h Range):  Temp:  [97.8 °F (36.6 °C)-98.4 °F (36.9 °C)] 98.1 °F (36.7 °C)  Pulse:  [] 80  Resp:  [16-20] 20  SpO2:  [97 %-99 %] 97 %  BP: (111-135)/(64-82) 135/71     Weight: 56.7 kg (125 lb)  Body mass index is 20.8 kg/m².    Intake/Output Summary (Last 24 hours) at 8/1/2019 2004  Last data filed at 8/1/2019 1704  Gross per 24 hour   Intake 420 ml   Output 9 ml   Net 411 ml      Physical Exam  General, patient a baseline dementia  Lungs:  Clear to auscultation bilaterally  Cardiac, regular imminent  Abdomen:  Soft nontender nondistended  Extremities, no edema  Psychiatric, on baseline dementia      Significant Labs:   BMP:   Recent Labs   Lab 08/01/19  0451   GLU 96  96     144   K 3.8  3.8     102   CO2 36*  36*   BUN 10  10   CREATININE 0.5  0.5   CALCIUM 9.0  9.0     CBC:   Recent Labs   Lab 07/31/19  0522 08/01/19  0450   WBC 8.09  8.09 6.88  6.88   HGB 12.7  12.7 12.7  12.7   HCT 39.6  39.6 39.8  39.8     255 273  273           Assessment/Plan:      Active Diagnoses:    Diagnosis Date Noted POA    PRINCIPAL PROBLEM:  Acute cystitis [N30.00] 07/28/2019 Unknown    Delirium [R41.0] 07/28/2019 Unknown    Cystitis [N30.90] 07/28/2019 Yes      Problems Resolved During this Admission:     VTE Risk Mitigation (From admission, onward)        Ordered     IP  VTE LOW RISK PATIENT  Once      07/28/19 4091           Acute metabolic encephalopathy/delirium-most like secondary to urinary tract infection  Acute delirium seems to be resolved  patient likely at baseline dementia  Will need placement, discussed with patient's daughter today  Discontinue ceftriaxone IVPB  - continue Fall precautions  -continue PT /OT eval and treat     Urinary tract infection  UCx pending, negative so far  -Calderon catheter  Discontinue antibiotics     Falls  -PT /OT eval and treat  -Fall precautions  - Radiographic imaging with no fractures or acute abnormalities     Essential hypertension- moderate control  -Continue home medications     History of atrial fibrillation-rate controlled  -continue aspirin     History of COPD-stable  -Continue home medications      Evens Downs MD  Department of Hospital Medicine   [facility]

## 2019-08-02 NOTE — PLAN OF CARE
Problem: Skin Injury Risk Increased  Goal: Skin Health and Integrity  Outcome: Ongoing (interventions implemented as appropriate)  Frequent repositioning

## 2019-08-05 ENCOUNTER — TELEPHONE (OUTPATIENT)
Dept: FAMILY MEDICINE | Facility: CLINIC | Age: 84
End: 2019-08-05

## 2019-08-05 NOTE — TELEPHONE ENCOUNTER
Spoke with Pt's daughter, Jumana Roach.  Ms. Miranda has been admitted to hospice.  Jumaan Roach asked that Dr. Ugarte be advised of final outcome

## 2019-08-17 ENCOUNTER — HOSPITAL ENCOUNTER (INPATIENT)
Facility: HOSPITAL | Age: 84
LOS: 4 days | Discharge: HOSPICE | DRG: 481 | End: 2019-08-21
Attending: EMERGENCY MEDICINE | Admitting: HOSPITALIST
Payer: MEDICARE
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
## 2019-08-17 DIAGNOSIS — E07.9 THYROID DISEASE: ICD-10-CM

## 2019-08-17 DIAGNOSIS — Z01.818 PREOP EXAMINATION: ICD-10-CM

## 2019-08-17 DIAGNOSIS — Z99.81 CHRONIC RESPIRATORY FAILURE WITH HYPOXIA, ON HOME O2 THERAPY: ICD-10-CM

## 2019-08-17 DIAGNOSIS — J43.8 OTHER EMPHYSEMA: ICD-10-CM

## 2019-08-17 DIAGNOSIS — J43.1 PANLOBULAR EMPHYSEMA: ICD-10-CM

## 2019-08-17 DIAGNOSIS — S72.002A CLOSED FRACTURE OF LEFT HIP, INITIAL ENCOUNTER: Primary | ICD-10-CM

## 2019-08-17 DIAGNOSIS — J96.11 CHRONIC RESPIRATORY FAILURE WITH HYPOXIA, ON HOME O2 THERAPY: ICD-10-CM

## 2019-08-17 LAB
ALBUMIN SERPL BCP-MCNC: 3.2 G/DL (ref 3.5–5.2)
ALP SERPL-CCNC: 108 U/L (ref 55–135)
ALT SERPL W/O P-5'-P-CCNC: 14 U/L (ref 10–44)
ANION GAP SERPL CALC-SCNC: 12 MMOL/L (ref 8–16)
AST SERPL-CCNC: 21 U/L (ref 10–40)
BASOPHILS # BLD AUTO: 0.02 K/UL (ref 0–0.2)
BASOPHILS NFR BLD: 0.2 % (ref 0–1.9)
BILIRUB SERPL-MCNC: 0.7 MG/DL (ref 0.1–1)
BILIRUB UR QL STRIP: NEGATIVE
BUN SERPL-MCNC: 17 MG/DL (ref 8–23)
CALCIUM SERPL-MCNC: 9.6 MG/DL (ref 8.7–10.5)
CHLORIDE SERPL-SCNC: 99 MMOL/L (ref 95–110)
CLARITY UR: CLEAR
CO2 SERPL-SCNC: 29 MMOL/L (ref 23–29)
COLOR UR: YELLOW
CREAT SERPL-MCNC: 0.9 MG/DL (ref 0.5–1.4)
DIFFERENTIAL METHOD: ABNORMAL
EOSINOPHIL # BLD AUTO: 0 K/UL (ref 0–0.5)
EOSINOPHIL NFR BLD: 0 % (ref 0–8)
ERYTHROCYTE [DISTWIDTH] IN BLOOD BY AUTOMATED COUNT: 12.9 % (ref 11.5–14.5)
EST. GFR  (AFRICAN AMERICAN): >60 ML/MIN/1.73 M^2
EST. GFR  (NON AFRICAN AMERICAN): 56 ML/MIN/1.73 M^2
GLUCOSE SERPL-MCNC: 139 MG/DL (ref 70–110)
GLUCOSE UR QL STRIP: NEGATIVE
HCT VFR BLD AUTO: 36.9 % (ref 37–48.5)
HGB BLD-MCNC: 11.9 G/DL (ref 12–16)
HGB UR QL STRIP: NEGATIVE
IMM GRANULOCYTES # BLD AUTO: 0.01 K/UL (ref 0–0.04)
INR PPP: 1 (ref 0.8–1.2)
KETONES UR QL STRIP: NEGATIVE
LEUKOCYTE ESTERASE UR QL STRIP: NEGATIVE
LYMPHOCYTES # BLD AUTO: 1 K/UL (ref 1–4.8)
LYMPHOCYTES NFR BLD: 11.6 % (ref 18–48)
MCH RBC QN AUTO: 32.4 PG (ref 27–31)
MCHC RBC AUTO-ENTMCNC: 32.2 G/DL (ref 32–36)
MCV RBC AUTO: 101 FL (ref 82–98)
MONOCYTES # BLD AUTO: 1 K/UL (ref 0.3–1)
MONOCYTES NFR BLD: 11.6 % (ref 4–15)
NEUTROPHILS # BLD AUTO: 6.4 K/UL (ref 1.8–7.7)
NEUTROPHILS NFR BLD: 76.5 % (ref 38–73)
NITRITE UR QL STRIP: NEGATIVE
NRBC BLD-RTO: 0 /100 WBC
PH UR STRIP: 6 [PH] (ref 5–8)
PLATELET # BLD AUTO: 259 K/UL (ref 150–350)
PMV BLD AUTO: 10.6 FL (ref 9.2–12.9)
POTASSIUM SERPL-SCNC: 4.1 MMOL/L (ref 3.5–5.1)
PROT SERPL-MCNC: 6.3 G/DL (ref 6–8.4)
PROT UR QL STRIP: NEGATIVE
PROTHROMBIN TIME: 10.4 SEC (ref 9–12.5)
RBC # BLD AUTO: 3.67 M/UL (ref 4–5.4)
SODIUM SERPL-SCNC: 140 MMOL/L (ref 136–145)
SP GR UR STRIP: <=1.005 (ref 1–1.03)
URN SPEC COLLECT METH UR: ABNORMAL
UROBILINOGEN UR STRIP-ACNC: NEGATIVE EU/DL
WBC # BLD AUTO: 8.43 K/UL (ref 3.9–12.7)

## 2019-08-17 PROCEDURE — 80053 COMPREHEN METABOLIC PANEL: CPT

## 2019-08-17 PROCEDURE — 63600175 PHARM REV CODE 636 W HCPCS: Performed by: HOSPITALIST

## 2019-08-17 PROCEDURE — 63600175 PHARM REV CODE 636 W HCPCS: Performed by: EMERGENCY MEDICINE

## 2019-08-17 PROCEDURE — 99900035 HC TECH TIME PER 15 MIN (STAT)

## 2019-08-17 PROCEDURE — 99285 EMERGENCY DEPT VISIT HI MDM: CPT

## 2019-08-17 PROCEDURE — 93005 ELECTROCARDIOGRAM TRACING: CPT

## 2019-08-17 PROCEDURE — 12000002 HC ACUTE/MED SURGE SEMI-PRIVATE ROOM

## 2019-08-17 PROCEDURE — 85025 COMPLETE CBC W/AUTO DIFF WBC: CPT

## 2019-08-17 PROCEDURE — 81003 URINALYSIS AUTO W/O SCOPE: CPT

## 2019-08-17 PROCEDURE — 36415 COLL VENOUS BLD VENIPUNCTURE: CPT

## 2019-08-17 PROCEDURE — 27000221 HC OXYGEN, UP TO 24 HOURS

## 2019-08-17 PROCEDURE — 85610 PROTHROMBIN TIME: CPT

## 2019-08-17 PROCEDURE — 94760 N-INVAS EAR/PLS OXIMETRY 1: CPT

## 2019-08-17 PROCEDURE — 86850 RBC ANTIBODY SCREEN: CPT

## 2019-08-17 RX ORDER — MORPHINE SULFATE 2 MG/ML
2 INJECTION, SOLUTION INTRAMUSCULAR; INTRAVENOUS EVERY 4 HOURS PRN
Status: DISCONTINUED | OUTPATIENT
Start: 2019-08-17 | End: 2019-08-17

## 2019-08-17 RX ORDER — LEVOTHYROXINE SODIUM 50 UG/1
50 TABLET ORAL
Status: DISCONTINUED | OUTPATIENT
Start: 2019-08-18 | End: 2019-08-19

## 2019-08-17 RX ORDER — FUROSEMIDE 20 MG/1
20 TABLET ORAL 2 TIMES DAILY
Status: ON HOLD | COMMUNITY
End: 2019-08-21 | Stop reason: HOSPADM

## 2019-08-17 RX ORDER — MORPHINE SULFATE ORAL SOLUTION 10 MG/5ML
10 SOLUTION ORAL
COMMUNITY

## 2019-08-17 RX ORDER — ONDANSETRON 2 MG/ML
4 INJECTION INTRAMUSCULAR; INTRAVENOUS EVERY 8 HOURS PRN
Status: DISCONTINUED | OUTPATIENT
Start: 2019-08-17 | End: 2019-08-17

## 2019-08-17 RX ORDER — BISACODYL 10 MG
10 SUPPOSITORY, RECTAL RECTAL DAILY PRN
COMMUNITY

## 2019-08-17 RX ORDER — CIPROFLOXACIN 500 MG/1
500 TABLET ORAL 2 TIMES DAILY
Status: ON HOLD | COMMUNITY
End: 2019-08-21 | Stop reason: HOSPADM

## 2019-08-17 RX ORDER — FLUTICASONE FUROATE AND VILANTEROL 200; 25 UG/1; UG/1
1 POWDER RESPIRATORY (INHALATION) DAILY
Status: DISCONTINUED | OUTPATIENT
Start: 2019-08-18 | End: 2019-08-18

## 2019-08-17 RX ORDER — MORPHINE SULFATE 2 MG/ML
2 INJECTION, SOLUTION INTRAMUSCULAR; INTRAVENOUS
Status: COMPLETED | OUTPATIENT
Start: 2019-08-17 | End: 2019-08-17

## 2019-08-17 RX ORDER — HYDROCODONE BITARTRATE AND ACETAMINOPHEN 5; 325 MG/1; MG/1
1 TABLET ORAL EVERY 6 HOURS PRN
COMMUNITY

## 2019-08-17 RX ORDER — ACETAMINOPHEN 650 MG/1
SUPPOSITORY RECTAL
COMMUNITY

## 2019-08-17 RX ORDER — RAMELTEON 8 MG/1
8 TABLET ORAL NIGHTLY PRN
Status: DISCONTINUED | OUTPATIENT
Start: 2019-08-17 | End: 2019-08-19

## 2019-08-17 RX ORDER — IPRATROPIUM BROMIDE AND ALBUTEROL SULFATE 2.5; .5 MG/3ML; MG/3ML
3 SOLUTION RESPIRATORY (INHALATION) EVERY 4 HOURS PRN
Status: DISCONTINUED | OUTPATIENT
Start: 2019-08-17 | End: 2019-08-21

## 2019-08-17 RX ORDER — ENOXAPARIN SODIUM 100 MG/ML
40 INJECTION SUBCUTANEOUS ONCE
Status: COMPLETED | OUTPATIENT
Start: 2019-08-17 | End: 2019-08-17

## 2019-08-17 RX ORDER — LORAZEPAM 2 MG/ML
CONCENTRATE ORAL
COMMUNITY

## 2019-08-17 RX ORDER — PROCHLORPERAZINE MALEATE 10 MG
10 TABLET ORAL EVERY 6 HOURS PRN
COMMUNITY

## 2019-08-17 RX ORDER — ALPRAZOLAM 0.25 MG/1
TABLET ORAL 3 TIMES DAILY
COMMUNITY

## 2019-08-17 RX ORDER — ACETAMINOPHEN 325 MG/1
650 TABLET ORAL EVERY 8 HOURS PRN
Status: DISCONTINUED | OUTPATIENT
Start: 2019-08-17 | End: 2019-08-18

## 2019-08-17 RX ORDER — POLYETHYLENE GLYCOL 3350 17 G/17G
17 POWDER, FOR SOLUTION ORAL DAILY
Status: DISCONTINUED | OUTPATIENT
Start: 2019-08-18 | End: 2019-08-21 | Stop reason: HOSPADM

## 2019-08-17 RX ORDER — MORPHINE SULFATE 2 MG/ML
2 INJECTION, SOLUTION INTRAMUSCULAR; INTRAVENOUS EVERY 4 HOURS PRN
Status: DISCONTINUED | OUTPATIENT
Start: 2019-08-17 | End: 2019-08-18

## 2019-08-17 RX ORDER — HYOSCYAMINE SULFATE 0.125 MG
125 TABLET ORAL EVERY 4 HOURS PRN
COMMUNITY

## 2019-08-17 RX ORDER — SODIUM CHLORIDE 0.9 % (FLUSH) 0.9 %
10 SYRINGE (ML) INJECTION
Status: DISCONTINUED | OUTPATIENT
Start: 2019-08-17 | End: 2019-08-18

## 2019-08-17 RX ORDER — ONDANSETRON 2 MG/ML
8 INJECTION INTRAMUSCULAR; INTRAVENOUS EVERY 8 HOURS PRN
Status: DISCONTINUED | OUTPATIENT
Start: 2019-08-17 | End: 2019-08-21 | Stop reason: HOSPADM

## 2019-08-17 RX ORDER — SODIUM CHLORIDE 0.9 % (FLUSH) 0.9 %
10 SYRINGE (ML) INJECTION
Status: DISCONTINUED | OUTPATIENT
Start: 2019-08-17 | End: 2019-08-21 | Stop reason: HOSPADM

## 2019-08-17 RX ORDER — AMOXICILLIN 250 MG
1 CAPSULE ORAL 2 TIMES DAILY
Status: DISCONTINUED | OUTPATIENT
Start: 2019-08-17 | End: 2019-08-21 | Stop reason: HOSPADM

## 2019-08-17 RX ADMIN — SODIUM CHLORIDE 500 ML: 0.9 INJECTION, SOLUTION INTRAVENOUS at 04:08

## 2019-08-17 RX ADMIN — MORPHINE SULFATE 2 MG: 2 INJECTION, SOLUTION INTRAMUSCULAR; INTRAVENOUS at 06:08

## 2019-08-17 RX ADMIN — MORPHINE SULFATE 2 MG: 2 INJECTION, SOLUTION INTRAMUSCULAR; INTRAVENOUS at 04:08

## 2019-08-17 RX ADMIN — ENOXAPARIN SODIUM 40 MG: 100 INJECTION SUBCUTANEOUS at 09:08

## 2019-08-17 RX ADMIN — MORPHINE SULFATE 2 MG: 2 INJECTION, SOLUTION INTRAMUSCULAR; INTRAVENOUS at 11:08

## 2019-08-17 NOTE — ED NOTES
Assumed care:  Ruth Roach is awake, alert and oriented x 3, skin warm and dry, with family at bedside.  Patient CO pain, begging for more pain medication.  Dr. Parrish notified, new order received.  Awaiting patient room to be cleaned to send her upstairs.

## 2019-08-17 NOTE — ED PROVIDER NOTES
Encounter Date: 8/17/2019       History     Chief Complaint   Patient presents with    Fall     last night co lt hip pain no LOC     89-year-old female presents complaining of fall yesterday.  Patient reports pain in left hip, when the pain persisted family called EMS and brought patient here for evaluation.  Currently no family members available patient complaining of pain in her hip and pelvis, patient unable to quantify pain patient has no other complaints at this time reports no other injuries        Review of patient's allergies indicates:  No Known Allergies  Past Medical History:   Diagnosis Date    Arthritis     right hip and right knee    Atrial fibrillation     Complex renal cyst 7/19/2012    COPD (chronic obstructive pulmonary disease)     HTN (hypertension)     Hyperlipidemia     Narcolepsy without cataplexy(347.00)     Osteoarthritis of hip     Thyroid disease     Vitamin B12 deficiency     Got shots at Dr's office-last time was 6 months ago     Past Surgical History:   Procedure Laterality Date    APPENDECTOMY      pt claims with tubal pregnancy    CATARACT EXTRACTION      OU    ECTOPIC PREGNANCY SURGERY      EGD (ESOPHAGOGASTRODUODENOSCOPY) N/A 7/18/2012    Performed by [facility]    EYE SURGERY      bilateral cataract    KNEE ARTHROSCOPY W/ MENISCAL REPAIR  2007    left knee    MASS EXCISION  age 15    left shoulder     Family History   Problem Relation Age of Onset    Stroke Mother     Heart disease Father     Hypertension Father     Cancer Father         leukemia    Alcohol abuse Brother     Arthritis Brother     Cataracts Brother     Diabetes Brother     Cancer Son         stomach cancer    Heart disease Son     Hypertension Son     Hyperlipidemia Son     Diabetes Son     Alcohol abuse Son     Arthritis Son         back    Hyperlipidemia Son     Alcohol abuse Son     COPD Son     Arthritis Son         back    Arthritis Son          shoulder    Alcohol abuse Son     Alcohol abuse Paternal Aunt     Amblyopia Neg Hx     Blindness Neg Hx     Glaucoma Neg Hx     Macular degeneration Neg Hx     Retinal detachment Neg Hx     Strabismus Neg Hx     Thyroid disease Neg Hx      Social History     Tobacco Use    Smoking status: Former Smoker     Packs/day: 1.00     Years: 63.00     Pack years: 63.00    Smokeless tobacco: Never Used   Substance Use Topics    Alcohol use: Yes     Comment: very rarely    Drug use: No     Review of Systems   Unable to perform ROS: Dementia   Musculoskeletal: Positive for arthralgias.       Physical Exam     Initial Vitals [08/17/19 1452]   BP Pulse Resp Temp SpO2   108/69 (!) 114 20 (!) 100.6 °F (38.1 °C) (!) 93 %      MAP       --         Physical Exam    Nursing note and vitals reviewed.  Constitutional: She appears well-developed and well-nourished. She is not diaphoretic. No distress.   HENT:   Head: Normocephalic and atraumatic.   Right Ear: External ear normal.   Left Ear: External ear normal.   Nose: Nose normal.   Mouth/Throat: Oropharynx is clear and moist. No oropharyngeal exudate.   Eyes: Conjunctivae and EOM are normal. Pupils are equal, round, and reactive to light. Right eye exhibits no discharge. Left eye exhibits no discharge. No scleral icterus.   Neck: Normal range of motion. Neck supple. No thyromegaly present. No tracheal deviation present. No JVD present.   Cardiovascular: Normal rate, regular rhythm, normal heart sounds and intact distal pulses. Exam reveals no gallop and no friction rub.    No murmur heard.  Pulmonary/Chest: Breath sounds normal. No stridor. No respiratory distress. She has no wheezes. She has no rhonchi. She has no rales. She exhibits no tenderness.   Abdominal: Soft. Bowel sounds are normal. She exhibits no distension and no mass. There is no tenderness. There is no rebound and no guarding.   Musculoskeletal: Normal range of motion. She exhibits no edema or tenderness.    Lymphadenopathy:     She has no cervical adenopathy.   Neurological: She is alert. She has normal strength. She displays normal reflexes. No cranial nerve deficit or sensory deficit.   Patient oriented to person, patient complains of pain in the left hip, positive tenderness to palpation no crepitus or obvious deformities 2+ pulses no sign of ligamentous laxity   Skin: Skin is warm and dry. No rash and no abscess noted. No erythema. No pallor.         ED Course   Procedures  Labs Reviewed   CBC W/ AUTO DIFFERENTIAL - Abnormal; Notable for the following components:       Result Value    RBC 3.67 (*)     Hemoglobin 11.9 (*)     Hematocrit 36.9 (*)     Mean Corpuscular Volume 101 (*)     Mean Corpuscular Hemoglobin 32.4 (*)     Gran% 76.5 (*)     Lymph% 11.6 (*)     All other components within normal limits   COMPREHENSIVE METABOLIC PANEL - Abnormal; Notable for the following components:    Glucose 139 (*)     Albumin 3.2 (*)     eGFR if non  56 (*)     All other components within normal limits   URINALYSIS, REFLEX TO URINE CULTURE - Abnormal; Notable for the following components:    Specific Gravity, UA <=1.005 (*)     All other components within normal limits    Narrative:     Preferred Collection Type->Urine, Clean Catch   PROTIME-INR     EKG Readings: (Independently Interpreted)   Initial Reading: No STEMI. Rhythm: Sinus Tachycardia. Heart Rate: 130. Ectopy: No Ectopy. Conduction: Normal. Axis: Normal.       Imaging Results          CT Head Without Contrast (Final result)  Result time 08/17/19 15:58:16    Final result by David Melgar MD (08/17/19 15:58:16)                 Impression:      No acute intracranial abnormality.  Mild involutional change and white matter disease.      Electronically signed by: David Melgar  Date:    08/17/2019  Time:    15:58             Narrative:    EXAMINATION:  CT HEAD WITHOUT CONTRAST    CLINICAL HISTORY:  Head trauma, mental status  change;    TECHNIQUE:  Low dose axial images were obtained through the head.  Coronal and sagittal reformations were also performed. Contrast was not administered.    COMPARISON:  07/28/2019    FINDINGS:  Motion limited exam.  Normal size ventricular system with anatomic variant cavum septum pellucidum.  There are cerebral involutional changes, especially notable along the anterior temporal lobes.  Periventricular white matter hypoattenuation is nonspecific but suggestive of chronic microvascular ischemic change.  No mass, hemorrhage or acute major vascular distribution infarct. No mass effect or midline shift. Surgical changes of the globes.   Paranasal sinuses and mastoid air cells are clear. Atherosclerosis.  No acute osseous abnormality.                               CT Cervical Spine Without Contrast (Final result)  Result time 08/17/19 15:54:52    Final result by David Melagr MD (08/17/19 15:54:52)                 Impression:      1. Straightening of normal cervical lordosis and grade 1 anterolisthesis C7 on T1.  2. No acute fracture.  Complete fusion of the C4 and C5 levels.  3. Multilevel degenerative change contributing to areas of spinal canal and bony neural foraminal narrowing as described.      Electronically signed by: David Melgar  Date:    08/17/2019  Time:    15:54             Narrative:    EXAMINATION:  CT CERVICAL SPINE WITHOUT CONTRAST    CLINICAL HISTORY:  C-spine trauma, NEXUS/CCR positive, +risk factor(s);    TECHNIQUE:  Low dose axial images, sagittal and coronal reformations were performed though the cervical spine.  Contrast was not administered.    COMPARISON:  None    FINDINGS:  Motion limited exam.    There is straightening of normal cervical lordosis.  There is 2 mm anterolisthesis C7 on T1. Vertebral body heights are maintained.  Motion and degree of osteopenia limits assessment for nondisplaced fracture.  Allowing for these limitations, no displaced fracture.  There is  fusion of the C4 and C5 vertebra and posterior elements.  There are severe degenerative changes at the C1-2 articulation with bone-on-bone contact.  There is degenerative disc disease severe at C5-6 and moderate at C6-7 as well as mild at several other cervical levels.  Disc is prominent posteriorly at C5-6 contributing to at least mild spinal canal stenosis.  Multilevel facet degenerative changes worst on the left at C3-4 and C4-5.  Uncovertebral spur and facet degenerative change contribute to bony neural foraminal narrowing on the right at C4-5 and C5-6 and on the left at C3-4 C5-6 and C6-7.  Included soft tissues are unremarkable.                               X-Ray Hips Bilateral 2 View Inc AP Pelvis (Final result)  Result time 08/17/19 15:46:58   Procedure changed from X-Ray Hip 2 View Left     Final result by David Melgar MD (08/17/19 15:46:58)                 Impression:      Comminuted angulated intertrochanteric fracture of the left femur.      Electronically signed by: David Melgar  Date:    08/17/2019  Time:    15:46             Narrative:    EXAMINATION:  XR HIPS BILATERAL 2 VIEW INCL AP PELVIS    CLINICAL HISTORY:  pain;  Unspecified fall, initial encounter    TECHNIQUE:  AP view of the pelvis and frogleg lateral views of both hips were performed.    COMPARISON:  07/28/2016    FINDINGS:  There is a comminuted fracture of the left proximal femur involving both the greater and lesser trochanters.  Dominant fragments demonstrate valgus impaction.  A greater trochanteric fragment is displaced by 1.5 cm.  A lesser trochanteric fragment is displaced by 1.5 cm.  Femoral head remains aligned with the acetabulum and there is no malalignment elsewhere.  Partially imaged degenerative change lower lumbar spine.  Minimal degenerative change of both hip joints.  Osteopenia.                                 Medical Decision Making:   History:   Old Medical Records: I decided to obtain old medical  records.  Initial Assessment:   Emergent evaluation of a 89-year-old female presenting with a fall yesterday, patient localizes pain to the left hip, differential diagnosis includes fracture, soft tissue injury, musculoskeletal pain              Attending Attestation:             Attending ED Notes:   Review of patient's x-ray of the pelvis and hips shows that she has a comminuted left intertrochanteric fracture.  There is no sign of dislocation.  Will assess for signs of further injury and contact Orthopedics, patient will likely require admission for pain control and orthopedic management    Patient has comminuted left intertrochanteric hip fracture patient seen and evaluated by Orthopedics, patient will be admitted to Internal Medicine and patient will be medically cleared for surgery tomorrow morning.             Clinical Impression:       ICD-10-CM ICD-9-CM   1. Closed fracture of left hip, initial encounter S72.002A 820.8   2. Fall W19.XXXA E888.9                                Jesus Parrish MD  08/17/19 8319

## 2019-08-18 ENCOUNTER — ANESTHESIA EVENT (OUTPATIENT)
Dept: SURGERY | Facility: HOSPITAL | Age: 84
DRG: 481 | End: 2019-08-18
Payer: MEDICARE

## 2019-08-18 ENCOUNTER — ANESTHESIA (OUTPATIENT)
Dept: SURGERY | Facility: HOSPITAL | Age: 84
DRG: 481 | End: 2019-08-18
Payer: MEDICARE

## 2019-08-18 LAB
ABO + RH BLD: NORMAL
BLD GP AB SCN CELLS X3 SERPL QL: NORMAL

## 2019-08-18 PROCEDURE — 63600175 PHARM REV CODE 636 W HCPCS: Performed by: ORTHOPAEDIC SURGERY

## 2019-08-18 PROCEDURE — 71000033 HC RECOVERY, INTIAL HOUR: Performed by: ORTHOPAEDIC SURGERY

## 2019-08-18 PROCEDURE — 27245 PR OPEN FIX INTER/SUBTROCH FX,IMPLNT: ICD-10-PCS | Mod: LT,,, | Performed by: ORTHOPAEDIC SURGERY

## 2019-08-18 PROCEDURE — 93005 ELECTROCARDIOGRAM TRACING: CPT

## 2019-08-18 PROCEDURE — 27201423 OPTIME MED/SURG SUP & DEVICES STERILE SUPPLY: Performed by: ORTHOPAEDIC SURGERY

## 2019-08-18 PROCEDURE — 63600175 PHARM REV CODE 636 W HCPCS: Performed by: NURSE PRACTITIONER

## 2019-08-18 PROCEDURE — C1713 ANCHOR/SCREW BN/BN,TIS/BN: HCPCS | Performed by: ORTHOPAEDIC SURGERY

## 2019-08-18 PROCEDURE — 94761 N-INVAS EAR/PLS OXIMETRY MLT: CPT

## 2019-08-18 PROCEDURE — 27200651 HC AIRWAY, LMA: Performed by: NURSE ANESTHETIST, CERTIFIED REGISTERED

## 2019-08-18 PROCEDURE — 25000003 PHARM REV CODE 250: Performed by: NURSE ANESTHETIST, CERTIFIED REGISTERED

## 2019-08-18 PROCEDURE — 63600175 PHARM REV CODE 636 W HCPCS: Performed by: HOSPITALIST

## 2019-08-18 PROCEDURE — D9220A PRA ANESTHESIA: ICD-10-PCS | Mod: CRNA,,, | Performed by: NURSE ANESTHETIST, CERTIFIED REGISTERED

## 2019-08-18 PROCEDURE — 63600175 PHARM REV CODE 636 W HCPCS: Performed by: NURSE ANESTHETIST, CERTIFIED REGISTERED

## 2019-08-18 PROCEDURE — D9220A PRA ANESTHESIA: Mod: ANES,,, | Performed by: ANESTHESIOLOGY

## 2019-08-18 PROCEDURE — D9220A PRA ANESTHESIA: Mod: CRNA,,, | Performed by: NURSE ANESTHETIST, CERTIFIED REGISTERED

## 2019-08-18 PROCEDURE — C1769 GUIDE WIRE: HCPCS | Performed by: ORTHOPAEDIC SURGERY

## 2019-08-18 PROCEDURE — 71000039 HC RECOVERY, EACH ADD'L HOUR: Performed by: ORTHOPAEDIC SURGERY

## 2019-08-18 PROCEDURE — 12000002 HC ACUTE/MED SURGE SEMI-PRIVATE ROOM

## 2019-08-18 PROCEDURE — 99223 1ST HOSP IP/OBS HIGH 75: CPT | Mod: ,,, | Performed by: ORTHOPAEDIC SURGERY

## 2019-08-18 PROCEDURE — 27245 TREAT THIGH FRACTURE: CPT | Mod: LT,,, | Performed by: ORTHOPAEDIC SURGERY

## 2019-08-18 PROCEDURE — 36000711: Performed by: ORTHOPAEDIC SURGERY

## 2019-08-18 PROCEDURE — 25000003 PHARM REV CODE 250: Performed by: HOSPITALIST

## 2019-08-18 PROCEDURE — 25000003 PHARM REV CODE 250: Performed by: ORTHOPAEDIC SURGERY

## 2019-08-18 PROCEDURE — 99223 PR INITIAL HOSPITAL CARE,LEVL III: ICD-10-PCS | Mod: ,,, | Performed by: ORTHOPAEDIC SURGERY

## 2019-08-18 PROCEDURE — D9220A PRA ANESTHESIA: ICD-10-PCS | Mod: ANES,,, | Performed by: ANESTHESIOLOGY

## 2019-08-18 PROCEDURE — 36000710: Performed by: ORTHOPAEDIC SURGERY

## 2019-08-18 PROCEDURE — 99900035 HC TECH TIME PER 15 MIN (STAT)

## 2019-08-18 PROCEDURE — 27000221 HC OXYGEN, UP TO 24 HOURS

## 2019-08-18 PROCEDURE — 37000009 HC ANESTHESIA EA ADD 15 MINS: Performed by: ORTHOPAEDIC SURGERY

## 2019-08-18 PROCEDURE — 37000008 HC ANESTHESIA 1ST 15 MINUTES: Performed by: ORTHOPAEDIC SURGERY

## 2019-08-18 DEVICE — SCREW LOCKING 40MM: Type: IMPLANTABLE DEVICE | Site: HIP | Status: FUNCTIONAL

## 2019-08-18 DEVICE — NAIL IM CANN 130 DEG 10X170: Type: IMPLANTABLE DEVICE | Site: HIP | Status: FUNCTIONAL

## 2019-08-18 RX ORDER — ENOXAPARIN SODIUM 100 MG/ML
40 INJECTION SUBCUTANEOUS EVERY 24 HOURS
Status: DISCONTINUED | OUTPATIENT
Start: 2019-08-18 | End: 2019-08-20

## 2019-08-18 RX ORDER — ALPRAZOLAM 0.25 MG/1
0.25 TABLET ORAL 3 TIMES DAILY
Status: DISCONTINUED | OUTPATIENT
Start: 2019-08-18 | End: 2019-08-18

## 2019-08-18 RX ORDER — SODIUM CHLORIDE 9 MG/ML
INJECTION, SOLUTION INTRAVENOUS CONTINUOUS
Status: ACTIVE | OUTPATIENT
Start: 2019-08-18 | End: 2019-08-19

## 2019-08-18 RX ORDER — FUROSEMIDE 20 MG/1
20 TABLET ORAL 2 TIMES DAILY
Status: DISCONTINUED | OUTPATIENT
Start: 2019-08-19 | End: 2019-08-19

## 2019-08-18 RX ORDER — EPHEDRINE SULFATE 50 MG/ML
INJECTION, SOLUTION INTRAVENOUS
Status: DISCONTINUED | OUTPATIENT
Start: 2019-08-18 | End: 2019-08-18

## 2019-08-18 RX ORDER — HYDROMORPHONE HYDROCHLORIDE 2 MG/ML
0.2 INJECTION, SOLUTION INTRAMUSCULAR; INTRAVENOUS; SUBCUTANEOUS EVERY 5 MIN PRN
Status: DISCONTINUED | OUTPATIENT
Start: 2019-08-18 | End: 2019-08-18

## 2019-08-18 RX ORDER — LIDOCAINE HCL/PF 100 MG/5ML
SYRINGE (ML) INTRAVENOUS
Status: DISCONTINUED | OUTPATIENT
Start: 2019-08-18 | End: 2019-08-18

## 2019-08-18 RX ORDER — MORPHINE SULFATE 4 MG/ML
4 INJECTION, SOLUTION INTRAMUSCULAR; INTRAVENOUS
Status: DISCONTINUED | OUTPATIENT
Start: 2019-08-18 | End: 2019-08-19

## 2019-08-18 RX ORDER — CEFAZOLIN SODIUM 1 G/3ML
INJECTION, POWDER, FOR SOLUTION INTRAMUSCULAR; INTRAVENOUS
Status: DISCONTINUED | OUTPATIENT
Start: 2019-08-18 | End: 2019-08-18

## 2019-08-18 RX ORDER — MORPHINE SULFATE 2 MG/ML
2 INJECTION, SOLUTION INTRAMUSCULAR; INTRAVENOUS ONCE
Status: COMPLETED | OUTPATIENT
Start: 2019-08-18 | End: 2019-08-18

## 2019-08-18 RX ORDER — LORAZEPAM 2 MG/ML
1 CONCENTRATE ORAL EVERY 6 HOURS PRN
Status: DISCONTINUED | OUTPATIENT
Start: 2019-08-18 | End: 2019-08-18

## 2019-08-18 RX ORDER — CEFAZOLIN SODIUM 1 G/50ML
1 SOLUTION INTRAVENOUS
Status: COMPLETED | OUTPATIENT
Start: 2019-08-18 | End: 2019-08-19

## 2019-08-18 RX ORDER — FENTANYL CITRATE 50 UG/ML
INJECTION, SOLUTION INTRAMUSCULAR; INTRAVENOUS
Status: DISCONTINUED | OUTPATIENT
Start: 2019-08-18 | End: 2019-08-18

## 2019-08-18 RX ORDER — SODIUM CHLORIDE 9 MG/ML
INJECTION, SOLUTION INTRAVENOUS CONTINUOUS
Status: DISCONTINUED | OUTPATIENT
Start: 2019-08-18 | End: 2019-08-18

## 2019-08-18 RX ORDER — SODIUM CHLORIDE, SODIUM LACTATE, POTASSIUM CHLORIDE, CALCIUM CHLORIDE 600; 310; 30; 20 MG/100ML; MG/100ML; MG/100ML; MG/100ML
INJECTION, SOLUTION INTRAVENOUS CONTINUOUS PRN
Status: DISCONTINUED | OUTPATIENT
Start: 2019-08-18 | End: 2019-08-18

## 2019-08-18 RX ORDER — LORAZEPAM 2 MG/ML
1 CONCENTRATE ORAL 4 TIMES DAILY
Status: DISCONTINUED | OUTPATIENT
Start: 2019-08-18 | End: 2019-08-19

## 2019-08-18 RX ORDER — IBUPROFEN 400 MG/1
800 TABLET ORAL 3 TIMES DAILY
Status: DISCONTINUED | OUTPATIENT
Start: 2019-08-18 | End: 2019-08-19

## 2019-08-18 RX ORDER — FENTANYL CITRATE 50 UG/ML
25 INJECTION, SOLUTION INTRAMUSCULAR; INTRAVENOUS EVERY 5 MIN PRN
Status: DISCONTINUED | OUTPATIENT
Start: 2019-08-18 | End: 2019-08-18

## 2019-08-18 RX ORDER — OXYCODONE HYDROCHLORIDE 5 MG/1
5 TABLET ORAL
Status: DISCONTINUED | OUTPATIENT
Start: 2019-08-18 | End: 2019-08-18

## 2019-08-18 RX ORDER — ETOMIDATE 2 MG/ML
INJECTION INTRAVENOUS
Status: DISCONTINUED | OUTPATIENT
Start: 2019-08-18 | End: 2019-08-18

## 2019-08-18 RX ORDER — MORPHINE SULFATE 4 MG/ML
4 INJECTION, SOLUTION INTRAMUSCULAR; INTRAVENOUS EVERY 4 HOURS PRN
Status: DISCONTINUED | OUTPATIENT
Start: 2019-08-18 | End: 2019-08-18

## 2019-08-18 RX ORDER — ONDANSETRON 2 MG/ML
INJECTION INTRAMUSCULAR; INTRAVENOUS
Status: DISCONTINUED | OUTPATIENT
Start: 2019-08-18 | End: 2019-08-18

## 2019-08-18 RX ORDER — HYDROCODONE BITARTRATE AND ACETAMINOPHEN 10; 325 MG/1; MG/1
1 TABLET ORAL EVERY 4 HOURS PRN
Status: DISCONTINUED | OUTPATIENT
Start: 2019-08-18 | End: 2019-08-18

## 2019-08-18 RX ORDER — HYDROCODONE BITARTRATE AND ACETAMINOPHEN 5; 325 MG/1; MG/1
1 TABLET ORAL EVERY 4 HOURS PRN
Status: DISCONTINUED | OUTPATIENT
Start: 2019-08-18 | End: 2019-08-18

## 2019-08-18 RX ORDER — PHENYLEPHRINE HYDROCHLORIDE 10 MG/ML
INJECTION INTRAVENOUS
Status: DISCONTINUED | OUTPATIENT
Start: 2019-08-18 | End: 2019-08-18

## 2019-08-18 RX ORDER — HYDROCODONE BITARTRATE AND ACETAMINOPHEN 10; 325 MG/1; MG/1
2 TABLET ORAL
Status: DISCONTINUED | OUTPATIENT
Start: 2019-08-18 | End: 2019-08-18

## 2019-08-18 RX ORDER — ACETAMINOPHEN 10 MG/ML
1000 INJECTION, SOLUTION INTRAVENOUS ONCE
Status: COMPLETED | OUTPATIENT
Start: 2019-08-18 | End: 2019-08-18

## 2019-08-18 RX ADMIN — FENTANYL CITRATE 25 MCG: 50 INJECTION, SOLUTION INTRAMUSCULAR; INTRAVENOUS at 11:08

## 2019-08-18 RX ADMIN — PHENYLEPHRINE HYDROCHLORIDE 100 MCG: 10 INJECTION INTRAVENOUS at 10:08

## 2019-08-18 RX ADMIN — MORPHINE SULFATE 2 MG: 2 INJECTION, SOLUTION INTRAMUSCULAR; INTRAVENOUS at 05:08

## 2019-08-18 RX ADMIN — CEFAZOLIN 2 G: 1 INJECTION, POWDER, FOR SOLUTION INTRAVENOUS at 10:08

## 2019-08-18 RX ADMIN — EPHEDRINE SULFATE 25 MG: 50 INJECTION, SOLUTION INTRAMUSCULAR; INTRAVENOUS; SUBCUTANEOUS at 10:08

## 2019-08-18 RX ADMIN — MORPHINE SULFATE 4 MG: 4 INJECTION, SOLUTION INTRAMUSCULAR; INTRAVENOUS at 10:08

## 2019-08-18 RX ADMIN — LORAZEPAM 1 MG: 2 SOLUTION, CONCENTRATE ORAL at 09:08

## 2019-08-18 RX ADMIN — FENTANYL CITRATE 25 MCG: 50 INJECTION, SOLUTION INTRAMUSCULAR; INTRAVENOUS at 10:08

## 2019-08-18 RX ADMIN — SENNOSIDES, DOCUSATE SODIUM 1 TABLET: 50; 8.6 TABLET, FILM COATED ORAL at 08:08

## 2019-08-18 RX ADMIN — ENOXAPARIN SODIUM 40 MG: 100 INJECTION SUBCUTANEOUS at 08:08

## 2019-08-18 RX ADMIN — LIDOCAINE HYDROCHLORIDE 50 MG: 20 INJECTION, SOLUTION INTRAVENOUS at 10:08

## 2019-08-18 RX ADMIN — ACETAMINOPHEN 1000 MG: 10 INJECTION, SOLUTION INTRAVENOUS at 04:08

## 2019-08-18 RX ADMIN — SODIUM CHLORIDE, SODIUM LACTATE, POTASSIUM CHLORIDE, AND CALCIUM CHLORIDE: .6; .31; .03; .02 INJECTION, SOLUTION INTRAVENOUS at 11:08

## 2019-08-18 RX ADMIN — IBUPROFEN 800 MG: 400 TABLET, FILM COATED ORAL at 03:08

## 2019-08-18 RX ADMIN — ETOMIDATE 6 MG: 2 INJECTION, SOLUTION INTRAVENOUS at 10:08

## 2019-08-18 RX ADMIN — SODIUM CHLORIDE, SODIUM LACTATE, POTASSIUM CHLORIDE, AND CALCIUM CHLORIDE: .6; .31; .03; .02 INJECTION, SOLUTION INTRAVENOUS at 10:08

## 2019-08-18 RX ADMIN — IBUPROFEN 800 MG: 400 TABLET, FILM COATED ORAL at 08:08

## 2019-08-18 RX ADMIN — LEVOTHYROXINE SODIUM 50 MCG: 50 TABLET ORAL at 05:08

## 2019-08-18 RX ADMIN — MORPHINE SULFATE 2 MG: 2 INJECTION, SOLUTION INTRAMUSCULAR; INTRAVENOUS at 08:08

## 2019-08-18 RX ADMIN — SODIUM CHLORIDE: 0.9 INJECTION, SOLUTION INTRAVENOUS at 07:08

## 2019-08-18 RX ADMIN — CEFAZOLIN SODIUM 1 G: 1 SOLUTION INTRAVENOUS at 09:08

## 2019-08-18 RX ADMIN — MORPHINE SULFATE 2 MG: 2 INJECTION, SOLUTION INTRAMUSCULAR; INTRAVENOUS at 02:08

## 2019-08-18 RX ADMIN — ONDANSETRON 4 MG: 2 INJECTION, SOLUTION INTRAMUSCULAR; INTRAVENOUS at 10:08

## 2019-08-18 RX ADMIN — EPHEDRINE SULFATE 25 MG: 50 INJECTION, SOLUTION INTRAMUSCULAR; INTRAVENOUS; SUBCUTANEOUS at 11:08

## 2019-08-18 NOTE — ANESTHESIA PREPROCEDURE EVALUATION
08/18/2019  Ruth Roach is a 89 y.o., female.    Anesthesia Evaluation    I have reviewed the Patient Summary Reports.    I have reviewed the Nursing Notes.      Review of Systems  Anesthesia Hx:  No problems with previous Anesthesia    Cardiovascular:   Hypertension Dysrhythmias atrial fibrillation    Pulmonary:   COPD, severe    Renal/:   Chronic Renal Disease    Musculoskeletal:   Arthritis     Psych:   Psychiatric History          Physical Exam  General:  Well nourished    Airway/Jaw/Neck:  Airway Findings: Mouth Opening: Normal Tongue: Normal  General Airway Assessment: Adult  TM Distance: Normal, at least 6 cm  Jaw/Neck Findings:  Neck ROM: Normal ROM     Eyes/Ears/Nose:  Eyes/Ears/Nose Findings:    Dental:  Dental Findings: Upper Dentures, Lower Dentures   Chest/Lungs:  Chest/Lungs Findings: Normal Respiratory Rate     Heart/Vascular:  Heart Findings: Rate: Normal  Rhythm: Regular Rhythm        Mental Status:  Mental Status Findings:  Cooperative, Alert and Oriented         Anesthesia Plan  Type of Anesthesia, risks & benefits discussed:  Anesthesia Type:  general  Patient's Preference: General  Intra-op Monitoring Plan: standard ASA monitors  Intra-op Monitoring Plan Comments:   Post Op Pain Control Plan: multimodal analgesia and per primary service following discharge from PACU  Post Op Pain Control Plan Comments:   Induction:   IV  Beta Blocker:  Patient is not currently on a Beta-Blocker (No further documentation required).       Informed Consent: Patient understands risks and agrees with Anesthesia plan.  Questions answered. Anesthesia consent signed with patient.  ASA Score: 4     Day of Surgery Review of History & Physical:    H&P update referred to the surgeon.         Ready For Surgery From Anesthesia Perspective.

## 2019-08-18 NOTE — SUBJECTIVE & OBJECTIVE
Past Medical History:   Diagnosis Date    Arthritis     right hip and right knee    Atrial fibrillation     Complex renal cyst 7/19/2012    COPD (chronic obstructive pulmonary disease)     HTN (hypertension)     Hyperlipidemia     Narcolepsy without cataplexy(347.00)     Osteoarthritis of hip     Thyroid disease     Vitamin B12 deficiency     Got shots at 's office-last time was 6 months ago       Past Surgical History:   Procedure Laterality Date    APPENDECTOMY      pt claims with tubal pregnancy    CATARACT EXTRACTION      OU    ECTOPIC PREGNANCY SURGERY      EGD (ESOPHAGOGASTRODUODENOSCOPY) N/A 7/18/2012    [facility]    EYE SURGERY      bilateral cataract    KNEE ARTHROSCOPY W/ MENISCAL REPAIR  2007    left knee    MASS EXCISION  age 15    left shoulder       Review of patient's allergies indicates:  No Known Allergies    No current facility-administered medications on file prior to encounter.      Current Outpatient Medications on File Prior to Encounter   Medication Sig    ALPRAZolam (XANAX) 0.25 MG tablet Take by mouth 3 (three) times daily.    azelastine (ASTELIN) 137 mcg (0.1 %) nasal spray 1 spray (137 mcg total) by Nasal route 2 (two) times daily.    ciprofloxacin HCl (CIPRO) 500 MG tablet Take 500 mg by mouth 2 (two) times daily.    furosemide (LASIX) 20 MG tablet Take 20 mg by mouth 2 (two) times daily.    HYDROcodone-acetaminophen (NORCO) 5-325 mg per tablet Take 1 tablet by mouth every 6 (six) hours as needed for Pain.    levothyroxine (SYNTHROID) 50 MCG tablet Take 1 tablet (50 mcg total) by mouth once daily.    sennosides/docusate sodium (SENNA PLUS ORAL) Take by mouth.    acetaminophen (TYLENOL) 650 MG Supp Place rectally.    albuterol-ipratropium (DUO-NEB) 2.5 mg-0.5 mg/3 mL nebulizer solution Inhale into the lungs.    ascorbic acid (VITAMIN C) 500 MG tablet Take 500 mg by mouth once daily.      bisacodyl (DULCOLAX) 10 mg Supp  Place 10 mg rectally daily as needed.    fluticasone-vilanterol (BREO ELLIPTA) 200-25 mcg/dose DsDv diskus inhaler Inhale 1 puff into the lungs once daily.    ginseng 100 mg Cap Take 1 tablet by mouth daily as needed.      hyoscyamine (ANASPAZ,LEVSIN) 0.125 mg Tab Take 125 mcg by mouth every 4 (four) hours as needed.    loperamide (IMODIUM) 2 mg capsule Take 2 mg by mouth 4 (four) times daily as needed.      lorazepam 2 mg/ml oral conc (ATIVAN) 2 mg/mL Conc Take by mouth.    methylphenidate HCl (RITALIN) 20 MG tablet Take 1 tablet (20 mg total) by mouth 3 (three) times daily with meals.    morphine 10 mg/5 mL solution Take 10 mg by mouth every 2 (two) hours as needed for Pain.    multivitamin capsule Take 1 capsule by mouth once daily.      prochlorperazine (COMPAZINE) 10 MG tablet Take 10 mg by mouth every 6 (six) hours as needed.    traMADol (ULTRAM) 50 mg tablet Take 1 tablet (50 mg total) by mouth every 12 (twelve) hours as needed for Pain.     Family History     Problem Relation (Age of Onset)    Alcohol abuse Brother, Son, Son, Son, Paternal Aunt    Arthritis Brother, Son, Son, Son    COPD Son    Cancer Father, Son    Cataracts Brother    Diabetes Brother, Son    Heart disease Father, Son    Hyperlipidemia Son, Son    Hypertension Father, Son    Stroke Mother        Tobacco Use    Smoking status: Former Smoker     Packs/day: 1.00     Years: 63.00     Pack years: 63.00    Smokeless tobacco: Never Used   Substance and Sexual Activity    Alcohol use: Yes     Comment: very rarely    Drug use: No    Sexual activity: Never     Review of Systems   Constitutional: Positive for fatigue (chronic). Negative for chills and fever.   Respiratory: Positive for cough (chronic) and shortness of breath (chronic).    Cardiovascular: Negative for chest pain.   Gastrointestinal: Negative for abdominal pain, nausea and vomiting.   Genitourinary: Negative for flank pain and frequency.   Musculoskeletal: Positive for  arthralgias (chronic). Negative for neck pain.   All other systems reviewed and are negative.    Objective:     Vital Signs (Most Recent):  Temp: 98.1 °F (36.7 °C) (08/17/19 1956)  Pulse: 89 (08/17/19 1956)  Resp: 20 (08/17/19 1956)  BP: (!) 113/54 (08/17/19 1956)  SpO2: 98 % (08/17/19 1956) Vital Signs (24h Range):  Temp:  [98.1 °F (36.7 °C)-100.6 °F (38.1 °C)] 98.1 °F (36.7 °C)  Pulse:  [] 89  Resp:  [20] 20  SpO2:  [93 %-98 %] 98 %  BP: ()/(54-84) 113/54     Weight: 58.9 kg (129 lb 13.6 oz)  Body mass index is 21.61 kg/m².    Physical Exam   Constitutional: She appears well-nourished. She is sleeping.  Non-toxic appearance. She has a sickly appearance. No distress.   HENT:   Head: Atraumatic.   Mouth/Throat: Oropharynx is clear and moist.   Eyes: Conjunctivae are normal. Right eye exhibits no discharge. Left eye exhibits no discharge.   Neck: Neck supple. No JVD present. No thyromegaly present.   Cardiovascular: Normal rate and regular rhythm.   Pulmonary/Chest: Effort normal. No respiratory distress. She has decreased breath sounds. She has no wheezes. She has no rhonchi.   Abdominal: Soft. Bowel sounds are normal. She exhibits no distension and no mass.   Musculoskeletal: She exhibits no edema.        Left hip: She exhibits bony tenderness.   Skin: Skin is warm and dry. No rash noted.           Significant Labs:   BMP:   Recent Labs   Lab 08/17/19  1610   *      K 4.1   CL 99   CO2 29   BUN 17   CREATININE 0.9   CALCIUM 9.6     CBC:   Recent Labs   Lab 08/17/19  1611   WBC 8.43   HGB 11.9*   HCT 36.9*          Significant Imaging: I have reviewed all pertinent imaging results/findings within the past 24 hours.

## 2019-08-18 NOTE — ASSESSMENT & PLAN NOTE
Monitor BP.  Treat as follows:  Hypertension Medications at home            furosemide (LASIX) 20 MG tablet Take 20 mg by mouth 2 (two) times daily.        She is not on any BP medications at this time while admitted.

## 2019-08-18 NOTE — TRANSFER OF CARE
"Anesthesia Transfer of Care Note    Patient: Ruth Roach    Procedure(s) Performed: Procedure(s) (LRB):  ORIF, FRACTURE, FEMUR, INTERTROCHANTERIC (Left)    Patient location: PACU    Anesthesia Type: general    Transport from OR: Transported from OR on 2-3 L/min O2 by NC with adequate spontaneous ventilation    Post pain: adequate analgesia    Post assessment: no apparent anesthetic complications and tolerated procedure well    Post vital signs: stable    Level of consciousness: awake and confused    Nausea/Vomiting: no nausea/vomiting    Complications: none    Transfer of care protocol was followed      Last vitals:   Visit Vitals  BP (!) 143/76 (Patient Position: Lying)   Pulse 95   Temp 36.9 °C (98.4 °F) (Oral)   Resp 18   Ht 5' 5" (1.651 m)   Wt 58.9 kg (129 lb 13.6 oz)   SpO2 95%   Breastfeeding? No   BMI 21.61 kg/m²     "

## 2019-08-18 NOTE — PLAN OF CARE
Problem: Adult Inpatient Plan of Care  Goal: Patient-Specific Goal (Individualization)  Outcome: Ongoing (interventions implemented as appropriate)  Pt is  Alert and oriented to self only. POC reviewed with family, family verbalized understanding. VSS. Remains afebrile. IV abx infused per order. Pain controlled with scheduled medication. Pt has been sleeping the majority of the day after surgery and did not require additional medications.  Remains free of injury. Bed low, breaks locked, call light in reach. Will monitor.

## 2019-08-18 NOTE — ASSESSMENT & PLAN NOTE
ARISCAT risk index score is 24, which puts patient in low risk category for pulmonary complication rate.  In my opinion, general anesthesia and intubation are reasonable options for this patient during the brigida placement tomorrow.    0 to 25 points: Low risk: 1.6% pulmonary complication rate     She is cleared from a cardiac risk perspective for the planned surgery.  RCRI of 0 points, which equates to Class I risk or LOW risk.    Morphine for pain.  Traction on left leg.  Lovenox 40 mg x 1 today for VTE prevention.

## 2019-08-18 NOTE — PLAN OF CARE
MDI Qday. Aerosol tx prn.    Patient's family says she has not been taking this mdi since she was placed on hospice.       08/18/19 7890   PRE-TX-O2   O2 Device (Oxygen Therapy) nasal cannula   $ Is the patient on Low Flow Oxygen? Yes   Flow (L/min) 3   SpO2 95 %   Pulse Oximetry Type Intermittent   $ Pulse Oximetry - Multiple Charge Pulse Oximetry - Multiple   Inhaler   $ Inhaler Charges Refused

## 2019-08-18 NOTE — ANESTHESIA POSTPROCEDURE EVALUATION
Anesthesia Post Evaluation    Patient: Ruth Roach    Procedure(s) Performed: Procedure(s) (LRB):  ORIF, FRACTURE, FEMUR, INTERTROCHANTERIC (Left)    Final Anesthesia Type: general  Patient location during evaluation: PACU  Patient participation: Yes- Able to Participate  Level of consciousness: sedated and confused  Post-procedure vital signs: reviewed and stable  Pain management: adequate  Airway patency: patent  PONV status at discharge: No PONV  Anesthetic complications: no      Cardiovascular status: blood pressure returned to baseline and hemodynamically stable  Respiratory status: unassisted, spontaneous ventilation and nasal cannula  Hydration status: euvolemic  Follow-up not needed.          Vitals Value Taken Time   /50 8/18/2019 12:15 PM   Temp 36.7 °C (98.1 °F) 8/18/2019 12:15 PM   Pulse 118 8/18/2019 12:17 PM   Resp 9 8/18/2019 12:17 PM   SpO2 98 % 8/18/2019 12:17 PM   Vitals shown include unvalidated device data.      Event Time     Out of Recovery 08/18/2019 12:30:00          Pain/Ankita Score: Pain Rating Prior to Med Admin: 8 (8/18/2019  8:21 AM)  Pain Rating Post Med Admin: 6 (8/18/2019  9:15 AM)  Ankita Score: 7 (8/18/2019 12:00 PM)

## 2019-08-18 NOTE — CONSULTS
Past Medical History:   Diagnosis Date    Arthritis     right hip and right knee    Atrial fibrillation     Complex renal cyst 7/19/2012    COPD (chronic obstructive pulmonary disease)     HTN (hypertension)     Hyperlipidemia     Narcolepsy without cataplexy(347.00)     Osteoarthritis of hip     Thyroid disease     Vitamin B12 deficiency     Got shots at 's office-last time was 6 months ago       Past Surgical History:   Procedure Laterality Date    APPENDECTOMY      pt claims with tubal pregnancy    CATARACT EXTRACTION      OU    ECTOPIC PREGNANCY SURGERY      EGD (ESOPHAGOGASTRODUODENOSCOPY) N/A 7/18/2012    Performed by [facility] at [facility]    EYE SURGERY      bilateral cataract    KNEE ARTHROSCOPY W/ MENISCAL REPAIR  2007    left knee    MASS EXCISION  age 15    left shoulder       Current Facility-Administered Medications   Medication    acetaminophen tablet 650 mg    albuterol-ipratropium 2.5 mg-0.5 mg/3 mL nebulizer solution 3 mL    fluticasone furoate-vilanterol 200-25 mcg/dose diskus inhaler 1 puff    levothyroxine tablet 50 mcg    morphine injection 2 mg    ondansetron injection 8 mg    polyethylene glycol packet 17 g    ramelteon tablet 8 mg    senna-docusate 8.6-50 mg per tablet 1 tablet    sodium chloride 0.9% flush 10 mL    sodium chloride 0.9% flush 10 mL       Review of patient's allergies indicates:  No Known Allergies    Family History   Problem Relation Age of Onset    Stroke Mother     Heart disease Father     Hypertension Father     Cancer Father         leukemia    Alcohol abuse Brother     Arthritis Brother     Cataracts Brother     Diabetes Brother     Cancer Son         stomach cancer    Heart disease Son     Hypertension Son     Hyperlipidemia Son     Diabetes Son     Alcohol abuse Son     Arthritis Son         back    Hyperlipidemia Son     Alcohol abuse Son     COPD Son     Arthritis Son         back    Arthritis Son          shoulder    Alcohol abuse Son     Alcohol abuse Paternal Aunt     Amblyopia Neg Hx     Blindness Neg Hx     Glaucoma Neg Hx     Macular degeneration Neg Hx     Retinal detachment Neg Hx     Strabismus Neg Hx     Thyroid disease Neg Hx        Social History     Socioeconomic History    Marital status:      Spouse name: Not on file    Number of children: Not on file    Years of education: Not on file    Highest education level: Not on file   Occupational History    Not on file   Social Needs    Financial resource strain: Not on file    Food insecurity:     Worry: Not on file     Inability: Not on file    Transportation needs:     Medical: Not on file     Non-medical: Not on file   Tobacco Use    Smoking status: Former Smoker     Packs/day: 1.00     Years: 63.00     Pack years: 63.00    Smokeless tobacco: Never Used   Substance and Sexual Activity    Alcohol use: Yes     Comment: very rarely    Drug use: No    Sexual activity: Never   Lifestyle    Physical activity:     Days per week: Not on file     Minutes per session: Not on file    Stress: Not on file   Relationships    Social connections:     Talks on phone: Not on file     Gets together: Not on file     Attends Rastafarian service: Not on file     Active member of club or organization: Not on file     Attends meetings of clubs or organizations: Not on file     Relationship status: Not on file   Other Topics Concern    Not on file   Social History Narrative    Not on file       Chief Complaint:   Chief Complaint   Patient presents with    Fall     last night co lt hip pain no LOC       Consulting Physician: Self, Aaareferral    History of present illness:    This is a 89 y.o. year old female who complains of left hip pain following a fall at home yesterday.    Review of Systems:    Constitution: Denies chills, fever, and sweats.  HENT: Denies headaches or blurry vision.  Cardiovascular: Denies chest pain or irregular heart  "beat.  Respiratory: Denies cough or shortness of breath.  Gastrointestinal: Denies abdominal pain, nausea, or vomiting.  Musculoskeletal:  Denies muscle cramps.  Neurological: Denies dizziness or focal weakness.  Psychiatric/Behavioral: Normal mental status.  Hematologic/Lymphatic: Denies bleeding problem or easy bruising/bleeding.  Skin: Denies rash or suspicious lesions.    Examination:    Vital Signs:    Vitals:    08/17/19 1956 08/17/19 2031 08/18/19 0500 08/18/19 0739   BP: (!) 113/54  (!) 116/53 (!) 143/76   Pulse: 89 92 (!) 111 95   Resp: 20 20  18   Temp: 98.1 °F (36.7 °C)  98 °F (36.7 °C) 98.4 °F (36.9 °C)   TempSrc: Oral  Oral Oral   SpO2: 98% 97% 95% (!) 93%   Weight: 58.9 kg (129 lb 13.6 oz)      Height: 5' 5" (1.651 m)          Body mass index is 21.61 kg/m².    This a well-developed, well nourished patient in no acute distress.    Alert and oriented x 3 and cooperative to examination.       Physical Exam: Left Hip Exam    Gait:   Non weight bearing    Skin  Rash:   None  Scars:   None    Inspection  Erythema:  None  Bruising:  Mild  Swelling:  Mild  Masses:  None  Lymphadenopathy: None    Range of Motion: Not tested due to fracture    Tenderness:  Diffuse    Strength:  Not tested due to fracture    Stability:  Not tested due to fracture    Sensation:  Intact    Vascular  Pulses:  Palpable distally          Imaging: X-rays ordered and images interpreted today personally by me of left hip shows a comminuted intertrochanteric fracture.        Assessment: Closed fracture of left hip, initial encounter  -     Case Request Operating Room: ORIF, FRACTURE, FEMUR, INTERTROCHANTERIC; Standing    Fall  -     X-Ray Hips Bilateral 2 View Inc AP Pelvis; Standing    Preop examination  -     EKG 12-lead; Standing    Other orders  -     Complete Blood Count (CBC); Standing  -     Comprehensive Metabolic Panel (CMP); Standing  -     Protime-INR; Standing  -     Urinalysis, Reflex to Urine Culture Urine, Clean Catch; " Standing  -     Insert Saline lock IV; Standing  -     EKG 12-lead; Standing  -     Vital Signs; Standing  -     Cardiac Monitoring - Adult; Standing  -     Cancel: Pulse Oximetry Continuous; Standing  -     CT Head Without Contrast; Standing  -     CT Cervical Spine Without Contrast; Standing  -     Cancel: X-Ray Pelvis Routine AP; Standing  -     Cancel: X-Ray Hip 2 View Left; Standing  -     Cancel: X-Ray Hip 2 View Right; Standing  -     Possible Hospitalization; Standing  -     sodium chloride 0.9% bolus 500 mL  -     morphine injection 2 mg  -     Vital signs; Standing  -     Intake and output; Standing  -     Notify physician ; Standing  -     sodium chloride 0.9% flush 10 mL  -     Cancel: IP VTE HIGH RISK PATIENT; Standing  -     Cancel: Pulse Oximetry Q4H; Standing  -     DNR (Do Not Resuscitate); Standing  -     Admit to Inpatient; Standing  -     Cancel: Bed rest; Standing  -     Diet NPO; Standing  -     Place sequential compression device; Standing  -     Discontinue: morphine injection 2 mg  -     Discontinue: ondansetron injection 4 mg  -     acetaminophen tablet 650 mg  -     Cancel: CBC auto differential; Standing  -     Cancel: Comprehensive metabolic panel; Standing  -     Inpatient consult to Orthopedic Surgery; Standing  -     morphine injection 2 mg  -     Bed rest; Standing  -     Log roll; Standing  -     Strict intake and output; Standing  -     Delirium Precautions; Standing  -     Notify physician ; Standing  -     Notify Physician; Standing  -     Notify physician ; Standing  -     Notify Orthopedic service; Standing  -     Notify Physician - Potential Need of Opioid Reversal; Standing  -     sodium chloride 0.9% flush 10 mL  -     polyethylene glycol packet 17 g  -     senna-docusate 8.6-50 mg per tablet 1 tablet  -     Cancel: CBC auto differential; Standing  -     Cancel: Pulse Oximetry Q4H; Standing  -     ramelteon tablet 8 mg  -     Type & Screen; Standing  -     Oxygen Continuous;  Standing  -     IP VTE HIGH RISK PATIENT; Standing  -     Misc nursing order (specify); Standing  -     ondansetron injection 8 mg  -     morphine injection 2 mg  -     fluticasone furoate-vilanterol 200-25 mcg/dose diskus inhaler 1 puff  -     MDI Daily; Standing  -     albuterol-ipratropium 2.5 mg-0.5 mg/3 mL nebulizer solution 3 mL  -     Inhalation Treatment Q4H PRN; Standing  -     levothyroxine tablet 50 mcg  -     enoxaparin injection 40 mg  -     IP consult to case management; Standing  -     Pulse Oximetry Q Shift; Standing  -     X-Ray Chest 1 View; Standing  -     acetaminophen (10 mg/mL) injection 1,000 mg  -     morphine injection 2 mg        Plan:  Discussed diagnosis with pt's son and he agreed to placement of left hip trochanteric nail.    After discussing the diagnosis and reviewing treatment options, the patient/family elected to proceed with surgical intervention.    In preparation for surgery:    A pre-operative clearance request was placed    Appropriate pre-operative labs were ordered.    Pertinent risk factors and comorbidities for surgery were identified and reviewed, including Afib and HTN.    Pre-operative antibiotics were ordered.    The risks, benefits, and alternatives to the procedure were explained to the patient/family including, but not limited to: incomplete pain relief, surgical failure, hardware failure, need for further procedures, damage to nerves, arteries, blood vessels and other structures, infection, Deep Vein Thrombosis (DVT), Pulmonary Embolus (PE), Complex Regional Pain Syndrome as well as general anesthetic complications including seizure, stroke, heart attack and even death. The patient/family understood these risks and wished to proceed and signed the informed consent. All questions were answered. No guarantees were implied or stated.    We will obtain the necessary clearances and schedule the patient for surgery.            DISCLAIMER: This note may have been dictated  using voice recognition software and may contain grammatical errors.     NOTE: Consult report sent to referring provider via [employer] EMR.

## 2019-08-18 NOTE — H&P
[facility] Hospital Medicine  History & Physical    Patient Name: Ruth Roach  MRN: [id number]  Admission Date: 8/17/2019  Attending Physician: Denis Watters MD   Primary Care Provider: Shannon Romo MD         Patient information was obtained from relative(s), past medical records and ER records.     Subjective:     Principal Problem:<principal problem not specified>    Chief Complaint:   Chief Complaint   Patient presents with    Fall     last night co lt hip pain no LOC        HPI: Pt presented to our ED today with left hip pain.  She fell yesterday at home.  Her family do not know how she fell but they suspect that she fell out of her bed.  She is on hospice for end-stage COPD.  She is oxygen-dependent.     Past Medical History:   Diagnosis Date    Arthritis     right hip and right knee    Atrial fibrillation     Complex renal cyst 7/19/2012    COPD (chronic obstructive pulmonary disease)     HTN (hypertension)     Hyperlipidemia     Narcolepsy without cataplexy(347.00)     Osteoarthritis of hip     Thyroid disease     Vitamin B12 deficiency     Got shots at Dr's office-last time was 6 months ago       Past Surgical History:   Procedure Laterality Date    APPENDECTOMY      pt claims with tubal pregnancy    CATARACT EXTRACTION      OU    ECTOPIC PREGNANCY SURGERY      EGD (ESOPHAGOGASTRODUODENOSCOPY) N/A 7/18/2012    Performed by [facility] at [facility] ENDO    EYE SURGERY      bilateral cataract    KNEE ARTHROSCOPY W/ MENISCAL REPAIR  2007    left knee    MASS EXCISION  age 15    left shoulder       Review of patient's allergies indicates:  No Known Allergies    No current facility-administered medications on file prior to encounter.      Current Outpatient Medications on File Prior to Encounter   Medication Sig    ALPRAZolam (XANAX) 0.25 MG tablet Take by mouth 3 (three) times daily.    azelastine (ASTELIN) 137 mcg (0.1 %) nasal spray 1 spray (137 mcg total)  by Nasal route 2 (two) times daily.    ciprofloxacin HCl (CIPRO) 500 MG tablet Take 500 mg by mouth 2 (two) times daily.    furosemide (LASIX) 20 MG tablet Take 20 mg by mouth 2 (two) times daily.    HYDROcodone-acetaminophen (NORCO) 5-325 mg per tablet Take 1 tablet by mouth every 6 (six) hours as needed for Pain.    levothyroxine (SYNTHROID) 50 MCG tablet Take 1 tablet (50 mcg total) by mouth once daily.    sennosides/docusate sodium (SENNA PLUS ORAL) Take by mouth.    acetaminophen (TYLENOL) 650 MG Supp Place rectally.    albuterol-ipratropium (DUO-NEB) 2.5 mg-0.5 mg/3 mL nebulizer solution Inhale into the lungs.    ascorbic acid (VITAMIN C) 500 MG tablet Take 500 mg by mouth once daily.      bisacodyl (DULCOLAX) 10 mg Supp Place 10 mg rectally daily as needed.    fluticasone-vilanterol (BREO ELLIPTA) 200-25 mcg/dose DsDv diskus inhaler Inhale 1 puff into the lungs once daily.    ginseng 100 mg Cap Take 1 tablet by mouth daily as needed.      hyoscyamine (ANASPAZ,LEVSIN) 0.125 mg Tab Take 125 mcg by mouth every 4 (four) hours as needed.    loperamide (IMODIUM) 2 mg capsule Take 2 mg by mouth 4 (four) times daily as needed.      lorazepam 2 mg/ml oral conc (ATIVAN) 2 mg/mL Conc Take by mouth.    methylphenidate HCl (RITALIN) 20 MG tablet Take 1 tablet (20 mg total) by mouth 3 (three) times daily with meals.    morphine 10 mg/5 mL solution Take 10 mg by mouth every 2 (two) hours as needed for Pain.    multivitamin capsule Take 1 capsule by mouth once daily.      prochlorperazine (COMPAZINE) 10 MG tablet Take 10 mg by mouth every 6 (six) hours as needed.    traMADol (ULTRAM) 50 mg tablet Take 1 tablet (50 mg total) by mouth every 12 (twelve) hours as needed for Pain.     Family History     Problem Relation (Age of Onset)    Alcohol abuse Brother, Son, Son, Son, Paternal Aunt    Arthritis Brother, Son, Son, Son    COPD Son    Cancer Father, Son    Cataracts Brother    Diabetes Brother, Son    Heart  disease Father, Son    Hyperlipidemia Son, Son    Hypertension Father, Son    Stroke Mother        Tobacco Use    Smoking status: Former Smoker     Packs/day: 1.00     Years: 63.00     Pack years: 63.00    Smokeless tobacco: Never Used   Substance and Sexual Activity    Alcohol use: Yes     Comment: very rarely    Drug use: No    Sexual activity: Never     Review of Systems   Constitutional: Positive for fatigue (chronic). Negative for chills and fever.   Respiratory: Positive for cough (chronic) and shortness of breath (chronic).    Cardiovascular: Negative for chest pain.   Gastrointestinal: Negative for abdominal pain, nausea and vomiting.   Genitourinary: Negative for flank pain and frequency.   Musculoskeletal: Positive for arthralgias (chronic). Negative for neck pain.   All other systems reviewed and are negative.    Objective:     Vital Signs (Most Recent):  Temp: 98.1 °F (36.7 °C) (08/17/19 1956)  Pulse: 89 (08/17/19 1956)  Resp: 20 (08/17/19 1956)  BP: (!) 113/54 (08/17/19 1956)  SpO2: 98 % (08/17/19 1956) Vital Signs (24h Range):  Temp:  [98.1 °F (36.7 °C)-100.6 °F (38.1 °C)] 98.1 °F (36.7 °C)  Pulse:  [] 89  Resp:  [20] 20  SpO2:  [93 %-98 %] 98 %  BP: ()/(54-84) 113/54     Weight: 58.9 kg (129 lb 13.6 oz)  Body mass index is 21.61 kg/m².    Physical Exam   Constitutional: She appears well-nourished. She is sleeping.  Non-toxic appearance. She has a sickly appearance. No distress.   HENT:   Head: Atraumatic.   Mouth/Throat: Oropharynx is clear and moist.   Eyes: Conjunctivae are normal. Right eye exhibits no discharge. Left eye exhibits no discharge.   Neck: Neck supple. No JVD present. No thyromegaly present.   Cardiovascular: Normal rate and regular rhythm.   Pulmonary/Chest: Effort normal. No respiratory distress. She has decreased breath sounds. She has no wheezes. She has no rhonchi.   Abdominal: Soft. Bowel sounds are normal. She exhibits no distension and no mass.    Musculoskeletal: She exhibits no edema.        Left hip: She exhibits bony tenderness.   Skin: Skin is warm and dry. No rash noted.           Significant Labs:   BMP:   Recent Labs   Lab 08/17/19  1610   *      K 4.1   CL 99   CO2 29   BUN 17   CREATININE 0.9   CALCIUM 9.6     CBC:   Recent Labs   Lab 08/17/19  1611   WBC 8.43   HGB 11.9*   HCT 36.9*          Significant Imaging: I have reviewed all pertinent imaging results/findings within the past 24 hours.    Assessment/Plan:     Closed fracture of left hip  ARISCAT risk index score is 24, which puts patient in low risk category for pulmonary complication rate.  In my opinion, general anesthesia and intubation are reasonable options for this patient during the brigida placement tomorrow.    0 to 25 points: Low risk: 1.6% pulmonary complication rate     She is cleared from a cardiac risk perspective for the planned surgery.  RCRI of 0 points, which equates to Class I risk or LOW risk.    Morphine for pain.  Traction on left leg.  Lovenox 40 mg x 1 today for VTE prevention.    Thyroid disease  Continue her usual dose of Synthroid before breakfast.      Chronic respiratory failure with hypoxia, on home O2 therapy  Continue O2 supplementation.  Monitor o2 sats and work of breathing.      HTN (hypertension)  Monitor BP.  Treat as follows:  Hypertension Medications at home            furosemide (LASIX) 20 MG tablet Take 20 mg by mouth 2 (two) times daily.        She is not on any BP medications at this time while admitted.      COPD (chronic obstructive pulmonary disease)  It is end-stage.  Use aerosol treatments as needed.        VTE Risk Mitigation (From admission, onward)        Ordered     IP VTE HIGH RISK PATIENT  Once      08/17/19 2016     Place sequential compression device  Until discontinued      08/17/19 2000             Denis Watters MD  Department of Hospital Medicine   [facility]

## 2019-08-18 NOTE — BRIEF OP NOTE
[facility]  Brief Operative Note    SUMMARY     Surgery Date: 8/18/2019     Surgeon(s) and Role:     * John Mace MD - Primary    Assisting Surgeon: None    Pre-op Diagnosis:  Closed fracture of left hip, initial encounter [S72.002A]    Post-op Diagnosis:  Post-Op Diagnosis Codes:     * Closed fracture of left hip, initial encounter [S72.002A]    Procedure(s) (LRB):  ORIF, FRACTURE, FEMUR, INTERTROCHANTERIC (Left)    Anesthesia: Choice    Description of Procedure: left hip intertrochanteric nailing    Description of the findings of the procedure: See Dictation     Estimated Blood Loss: 50 mL         Specimens:   Specimen (12h ago, onward)    None      Dictation #1  MRN:[id number]  [facility]:[id number]  [id number]

## 2019-08-18 NOTE — PROGRESS NOTES
Pt in bed moaning and shaking saying she is in pain. JOAQUIN Barbour NP notified received orders for a one time dose of IV Tylenol 1000mg. NP also notified that pts HR is sustaining in the 120s. No new orders for elevated HR. Will continue to monitor.

## 2019-08-18 NOTE — HPI
Pt presented to our ED today with left hip pain.  She fell yesterday at home.  Her family do not know how she fell but they suspect that she fell out of her bed.  She is on hospice for end-stage COPD.  She is oxygen-dependent.

## 2019-08-18 NOTE — PROGRESS NOTES
08/18/19 1804   Discharge Assessment   Assessment Type Discharge Planning Assessment       SW attempted to complete a discharge planning assessment, however pt was still waking up from anesthesia and pt's grandson was unable to answer any of SW's questions. NORBERT will follow up at a later date.

## 2019-08-19 LAB
ANION GAP SERPL CALC-SCNC: 9 MMOL/L (ref 8–16)
BASOPHILS # BLD AUTO: 0.02 K/UL (ref 0–0.2)
BASOPHILS NFR BLD: 0.2 % (ref 0–1.9)
BUN SERPL-MCNC: 28 MG/DL (ref 8–23)
CALCIUM SERPL-MCNC: 8.6 MG/DL (ref 8.7–10.5)
CHLORIDE SERPL-SCNC: 99 MMOL/L (ref 95–110)
CO2 SERPL-SCNC: 32 MMOL/L (ref 23–29)
CREAT SERPL-MCNC: 1.1 MG/DL (ref 0.5–1.4)
DIFFERENTIAL METHOD: ABNORMAL
EOSINOPHIL # BLD AUTO: 0.2 K/UL (ref 0–0.5)
EOSINOPHIL NFR BLD: 1.8 % (ref 0–8)
ERYTHROCYTE [DISTWIDTH] IN BLOOD BY AUTOMATED COUNT: 13.1 % (ref 11.5–14.5)
EST. GFR  (AFRICAN AMERICAN): 51 ML/MIN/1.73 M^2
EST. GFR  (NON AFRICAN AMERICAN): 44 ML/MIN/1.73 M^2
GLUCOSE SERPL-MCNC: 100 MG/DL (ref 70–110)
HCT VFR BLD AUTO: 29.6 % (ref 37–48.5)
HGB BLD-MCNC: 9.2 G/DL (ref 12–16)
IMM GRANULOCYTES # BLD AUTO: 0.03 K/UL (ref 0–0.04)
LYMPHOCYTES # BLD AUTO: 0.9 K/UL (ref 1–4.8)
LYMPHOCYTES NFR BLD: 10.5 % (ref 18–48)
MCH RBC QN AUTO: 31.6 PG (ref 27–31)
MCHC RBC AUTO-ENTMCNC: 31.1 G/DL (ref 32–36)
MCV RBC AUTO: 102 FL (ref 82–98)
MONOCYTES # BLD AUTO: 1.2 K/UL (ref 0.3–1)
MONOCYTES NFR BLD: 14 % (ref 4–15)
NEUTROPHILS # BLD AUTO: 6.5 K/UL (ref 1.8–7.7)
NEUTROPHILS NFR BLD: 73.2 % (ref 38–73)
NRBC BLD-RTO: 0 /100 WBC
PLATELET # BLD AUTO: 208 K/UL (ref 150–350)
PMV BLD AUTO: 10.9 FL (ref 9.2–12.9)
POTASSIUM SERPL-SCNC: 3.8 MMOL/L (ref 3.5–5.1)
RBC # BLD AUTO: 2.91 M/UL (ref 4–5.4)
SODIUM SERPL-SCNC: 140 MMOL/L (ref 136–145)
WBC # BLD AUTO: 8.84 K/UL (ref 3.9–12.7)

## 2019-08-19 PROCEDURE — 99024 POSTOP FOLLOW-UP VISIT: CPT | Mod: POP,,, | Performed by: ORTHOPAEDIC SURGERY

## 2019-08-19 PROCEDURE — 27000221 HC OXYGEN, UP TO 24 HOURS

## 2019-08-19 PROCEDURE — 97162 PT EVAL MOD COMPLEX 30 MIN: CPT | Performed by: PHYSICAL THERAPIST

## 2019-08-19 PROCEDURE — 99900035 HC TECH TIME PER 15 MIN (STAT)

## 2019-08-19 PROCEDURE — 25000003 PHARM REV CODE 250: Performed by: ORTHOPAEDIC SURGERY

## 2019-08-19 PROCEDURE — 99024 PR POST-OP FOLLOW-UP VISIT: ICD-10-PCS | Mod: POP,,, | Performed by: ORTHOPAEDIC SURGERY

## 2019-08-19 PROCEDURE — G8979 MOBILITY GOAL STATUS: HCPCS | Mod: CL | Performed by: PHYSICAL THERAPIST

## 2019-08-19 PROCEDURE — 94761 N-INVAS EAR/PLS OXIMETRY MLT: CPT

## 2019-08-19 PROCEDURE — 63600175 PHARM REV CODE 636 W HCPCS: Performed by: ORTHOPAEDIC SURGERY

## 2019-08-19 PROCEDURE — 12000002 HC ACUTE/MED SURGE SEMI-PRIVATE ROOM

## 2019-08-19 PROCEDURE — 80048 BASIC METABOLIC PNL TOTAL CA: CPT

## 2019-08-19 PROCEDURE — G8978 MOBILITY CURRENT STATUS: HCPCS | Mod: CM | Performed by: PHYSICAL THERAPIST

## 2019-08-19 PROCEDURE — 85025 COMPLETE CBC W/AUTO DIFF WBC: CPT

## 2019-08-19 PROCEDURE — 25000003 PHARM REV CODE 250: Performed by: HOSPITALIST

## 2019-08-19 PROCEDURE — 36415 COLL VENOUS BLD VENIPUNCTURE: CPT

## 2019-08-19 PROCEDURE — 63600175 PHARM REV CODE 636 W HCPCS: Performed by: HOSPITALIST

## 2019-08-19 PROCEDURE — 97110 THERAPEUTIC EXERCISES: CPT | Performed by: PHYSICAL THERAPIST

## 2019-08-19 RX ORDER — TRIPROLIDINE/PSEUDOEPHEDRINE 2.5MG-60MG
600 TABLET ORAL EVERY 8 HOURS
Status: DISCONTINUED | OUTPATIENT
Start: 2019-08-19 | End: 2019-08-20

## 2019-08-19 RX ORDER — MORPHINE SULFATE ORAL SOLUTION 10 MG/5ML
10 SOLUTION ORAL EVERY 4 HOURS PRN
Status: DISCONTINUED | OUTPATIENT
Start: 2019-08-19 | End: 2019-08-21 | Stop reason: HOSPADM

## 2019-08-19 RX ORDER — LORAZEPAM 2 MG/ML
1 CONCENTRATE ORAL EVERY 6 HOURS PRN
Status: DISCONTINUED | OUTPATIENT
Start: 2019-08-19 | End: 2019-08-21 | Stop reason: HOSPADM

## 2019-08-19 RX ORDER — TRIPROLIDINE/PSEUDOEPHEDRINE 2.5MG-60MG
600 TABLET ORAL EVERY 8 HOURS
Status: DISCONTINUED | OUTPATIENT
Start: 2019-08-19 | End: 2019-08-19

## 2019-08-19 RX ADMIN — MORPHINE SULFATE 4 MG: 4 INJECTION, SOLUTION INTRAMUSCULAR; INTRAVENOUS at 04:08

## 2019-08-19 RX ADMIN — LEVOTHYROXINE SODIUM 50 MCG: 50 TABLET ORAL at 06:08

## 2019-08-19 RX ADMIN — MORPHINE SULFATE 4 MG: 4 INJECTION, SOLUTION INTRAMUSCULAR; INTRAVENOUS at 06:08

## 2019-08-19 RX ADMIN — MORPHINE SULFATE 4 MG: 4 INJECTION, SOLUTION INTRAMUSCULAR; INTRAVENOUS at 09:08

## 2019-08-19 RX ADMIN — LORAZEPAM 1 MG: 2 SOLUTION, CONCENTRATE ORAL at 09:08

## 2019-08-19 RX ADMIN — LORAZEPAM 1 MG: 2 SOLUTION, CONCENTRATE ORAL at 01:08

## 2019-08-19 RX ADMIN — ENOXAPARIN SODIUM 40 MG: 100 INJECTION SUBCUTANEOUS at 04:08

## 2019-08-19 RX ADMIN — MORPHINE SULFATE 10 MG: 10 SOLUTION ORAL at 09:08

## 2019-08-19 RX ADMIN — CEFAZOLIN SODIUM 1 G: 1 SOLUTION INTRAVENOUS at 09:08

## 2019-08-19 RX ADMIN — SENNOSIDES, DOCUSATE SODIUM 1 TABLET: 50; 8.6 TABLET, FILM COATED ORAL at 08:08

## 2019-08-19 RX ADMIN — POLYETHYLENE GLYCOL 3350 17 G: 17 POWDER, FOR SOLUTION ORAL at 08:08

## 2019-08-19 RX ADMIN — IBUPROFEN 800 MG: 400 TABLET, FILM COATED ORAL at 08:08

## 2019-08-19 RX ADMIN — IBUPROFEN 600 MG: 200 SUSPENSION ORAL at 09:08

## 2019-08-19 RX ADMIN — FUROSEMIDE 20 MG: 20 TABLET ORAL at 08:08

## 2019-08-19 RX ADMIN — CEFAZOLIN SODIUM 1 G: 1 SOLUTION INTRAVENOUS at 02:08

## 2019-08-19 NOTE — OP NOTE
DATE OF PROCEDURE:  08/18/2019.    PREOPERATIVE DIAGNOSIS:  Left hip fracture.    POSTOPERATIVE DIAGNOSIS:  Left hip fracture.    PROCEDURE:  Left hip intertrochanteric nail placement.    SURGEON:  John Mace M.D.    ASSISTANT:  John Aceves    ANESTHESIA:  General.    HISTORY:  Ms. Roach is a 89-year-old woman who sustained a fall at home   yesterday.  She was brought to the ER and diagnosed with a left hip   intertrochanteric fracture.  Orthopedics was consulted and we discussed with the   family our recommendation to do a trochanteric nailing.  The risks and benefits   of surgery including the potential for incomplete pain relief and the need for   further procedures were discussed with the patient's family who expressed they   understood and wished to proceed.  Informed consent was obtained.    PROCEDURE IN DETAIL:  After verifying informed consent and correct site, the   patient was brought back to the Operating Room, placed on the OR table in a   supine position.  Preoperative IV antibiotics were given and a timeout was   performed.  The patient was then positioned on the fracture table with the left   leg in traction.  We then used the C-arm to reduce the fracture to an acceptable   position on both AP and lateral projections.  We then created a 4 cm incision   superior to the greater trochanter and dissected down through skin and   subcutaneous tissue until we came down on to the greater trochanter.  We then   placed our starting awl in the correct position and verified once again on AP   and lateral projections that we were in the right location.  We then advanced   our awl into the trochanter and then inserted a guidewire down the leg.  Once   again, we verified on AP and lateral projections that our guidewire was in the   appropriate position.  Once our guidewire was in place, we removed our starting   awl and used a reamer to break the greater trochanter cortex for insertion of   our nail.  We then  inserted a [employer] trochanteric femoral nail into the opened   trochanteric site and down into the femur.  We verified under C-arm projections   the position of our nail.  Once we were in the right position, we then used the   guide jig to put a wire from the lateral cortex up into the femoral neck and   head.  We again verified on AP and lateral projections that our guidewire was in   the appropriate position.  Once our guidewire was in place, we measured the   length and then inserted a 105 mm screw.  The screw was then statically locked.    With the hip screw in place and locked, we turned our attention distally and   placed a 40 mm bicortical screw across the distal end of the nail.  At this   point, the equipment was removed from the knee and we took our final pictures   verifying once again that our nail and screws were of appropriate length and in   the appropriate position.  The wounds were then copiously irrigated.  The IT   band was approximated using 0 Vicryl and the skin was approximated using 2-0   Vicryl and then closed using staples.  A sterile dressing was applied.  The   patient was then awoken from anesthesia, extubated, and brought to the PACU in   good condition.    TOTAL TOURNIQUET TIME:  None.    DRAINS:  None.    SPECIMENS:  None.    COMPLICATIONS:  None.    ESTIMATED BLOOD LOSS:  50 mL    POSTOPERATIVE PLAN:  The patient will be admitted back to the floor for   postoperative care.        TONI  dd: 08/18/2019 11:25:28 (CDT)  td: 08/18/2019 14:55:48 (CDT)  Doc ID   #[id number]  Job ID #[id number]    CC:

## 2019-08-19 NOTE — PLAN OF CARE
Cm completed the assessment with the sister-in-law Jumana Roach. Pt lives at home with her brother.  Pt has 24 hours Sitters with [facility] and pt was on Hospice with [facility].  PCP is Dr. Ugarte. Insurance verified as Medicare and [facility].  Pt is a readmit 7/30/19 from [facility].  Family denies diabetes, dialysis and coumadin.  Pt has POA/LW.  Josephine will bring a copy of the documents.  Pt's sons Rey and Jean,and daughter Ruth has POA.  Pt is on Oxygen.  Josephine informed me that the pt may go back home with [facility] or HH.  Pending PT/OT Eval. Disposition:  Cm will follow-up with [facility] and review PT/OT evaluation.  Possible discharge to home with AIM VS HH VS Hospice.       08/19/19 1110   Discharge Assessment   Assessment Type Discharge Planning Assessment   Confirmed/corrected address and phone number on facesheet? Yes   Assessment information obtained from? Caregiver  (daughter in law- Jumana Roach)   Communicated expected length of stay with patient/caregiver yes   Prior to hospitilization cognitive status: Alert/Oriented   Prior to hospitalization functional status: Assistive Equipment;Needs Assistance;Partially Dependent   Current cognitive status: Alert/Oriented   Current Functional Status: Assistive Equipment;Needs Assistance;Partially Dependent   Facility Arrived From: home   Lives With sibling(s)   Able to Return to Prior Arrangements yes   Is patient able to care for self after discharge? No   Who are your caregiver(s) and their phone number(s)? daughter-in-lawElliot Roach - [phone]/ son Rey [facility] - [phone]   Patient's perception of discharge disposition hospice/home;other (comments)  (24 hours Sitters)   Readmission Within the Last 30 Days current reason for admission unrelated to previous admission   If yes, most recent facility name: [facility]   Patient currently being followed by outpatient case management? No   Patient currently receives any other outside agency services? Yes   Name and contact  number of agency or person providing outside services depend[facility] sit[facility]/Hospice-[facility]   Is it the patient/care giver preference to resume care with the current outside agency? Yes   Equipment Currently Used at Home oxygen;wheelchair;bedside commode;nebulizer;hospital bed;bath bench   Do you have any problems affording any of your prescribed medications? No   Is the patient taking medications as prescribed? yes   Does the patient have transportation home? Yes   Transportation Anticipated family or friend will provide   Dialysis Name and Scheduled days na   Does the patient receive services at the [facility]? No   Discharge Plan A Home with family;Hospice/home   Discharge Plan B Home with family;Home Health   Patient/Family in Agreement with Plan yes   Readmission Questionnaire   At the time of your discharge, did someone talk to you about what your health problems were? Yes   At the time of discharge, did someone talk to you about what to watch out for regarding worsening of your health problem? Yes   At the time of discharge, did someone talk to you about what to do if you experienced worsening of your health problem? Yes   At the time of discharge, did someone talk to you about which medication to take when you left the hospital and which ones to stop taking? Yes   At the time of discharge, did someone talk to you about when and where to follow up with a doctor after you left the hospital? Yes   What do you believe caused you to be sick enough to be re-admitted? Pt fell- fx of left hip   How often do you need to have someone help you when you read instructions, pamphlets, or other written material from your doctor or pharmacy? Often   Do you have problems taking your medications as prescribed? No   Do you have any problems affording any of  your prescribed medications? No   Do you have problems obtaining/receiving your medications? No   Does the patient have transportation to healthcare appointments? Yes    Living Arrangements house   Does the patient have family/friends to help with healtcare needs after discharge? yes   Does your caregiver provide all the help you need? Yes  (24 hour sitters)   Are you currently feeling confused? No   Are you currently having problems thinking? Yes   Are you currently having memory problems? Yes   Have you felt down, depressed, or hopeless? 0   Have you felt little interest or pleasure in doing things? 0   In the last 7 days, my sleep quality was: fair

## 2019-08-19 NOTE — PLAN OF CARE
Problem: Adult Inpatient Plan of Care  Goal: Plan of Care Review  Outcome: Ongoing (interventions implemented as appropriate)  Pt AAO to self only, VSS, Pt afebrile this shift. POD 1 of an ORIF , PIV CDI/infusing. SX Dx wnl., Pt displaying symptoms of dementia. Pt not eating or drinking/Boost ordered as a supplement. Comfort measures implemented. Family at the bedside. Pt free of falls and injury this shift. Bed locked and lowest position. Call light in reach, Q2 Nurse rounding for safety. Will continue to monitor.

## 2019-08-19 NOTE — PT/OT/SLP EVAL
"Physical Therapy Evaluation    Patient Name:  Ruth Roach   MRN:  [id number]    Recommendations:     Discharge Recommendations:  (Pt will require 24/7 assistance.  SNF vs return home with hospice)   Discharge Equipment Recommendations: none   Barriers to discharge: None    Assessment:     Ruth Roach is a 89 y.o. female admitted with a medical diagnosis of <principal problem not specified>.  She presents with the following impairments/functional limitations:  weakness, impaired self care skills, impaired balance, impaired endurance, impaired functional mobilty, gait instability, decreased safety awareness, decreased upper extremity function, pain, impaired cognition, decreased lower extremity function, orthopedic precautions.  Pt received asleep in bed, dtr present and was able to give information on PLOF, but left for the tx session.  Pt sleeping soundly, and did not awaken through PROM of B LE's, but does occasionally grimace with movement of L LE.  Pt required Total A +2 for supine<>sit on the EOB.  At that point, she mildly awakened and was able to nod her head yes/no to a few questions and make statements like "no don't do that." Pt required CGA for static sitting balance on the EOB for ~2 min, and then total A for sit<>supine.      Rehab Prognosis: Poor; patient would benefit from acute skilled PT services to address these deficits and reach maximum level of function.    Recent Surgery: Procedure(s) (LRB):  ORIF, FRACTURE, FEMUR, INTERTROCHANTERIC (Left) 1 Day Post-Op    Plan:     During this hospitalization, patient to be seen daily to address the identified rehab impairments via therapeutic activities, therapeutic exercises and progress toward the following goals:    · Plan of Care Expires:  09/02/19    Subjective     Chief Complaint: pain in L hip - pt had received morphine ~1 hr prior to tx session  Patient/Family Comments/goals: none stated  Pain/Comfort:  Pain Rating 1: (Pt did not rate, but moans " periodically when L hip is moved. )  Location - Side 1: Left  Location 1: hip  Pain Addressed 1: Reposition, Distraction, Pre-medicate for activity    Patients cultural, spiritual, Anglican conflicts given the current situation: other (see comments)    Living Environment:  Pt lives at home, and has 24/7 nursing staff care with hospice.    Prior to admission, patients level of function was requiring assistance with all functional mobility.  Dtr reports that she required assist for transfers from the bed<>BSC, and ~1x per week they would assist her onto the shower chair and wheel her into the shower.  Equipment used at home: wheelchair, bedside commode, oxygen, bath bench.  DME owned (not currently used): none.  Upon discharge, patient will have assistance from 24/7 nursing staff at home.    Objective:     Communicated with nurse Botello prior to session.  Patient found HOB elevated with peripheral IV, oxygen, telemetry  upon PT entry to room.    General Precautions: Standard, fall   Orthopedic Precautions:LLE weight bearing as tolerated(Per Dr. Valencia's progress note on 8/19)   Braces: N/A     Exams:  · Cognitive Exam:  Patient is oriented to unable to be assessed due to pt's level of lethargy  · Postural Exam:  Patient presented with the following abnormalities:    · -       Rounded shoulders  · -       Forward head  · -       Abnormal trunk flexion  · RLE ROM: WFL  · RLE Strength: unable to be determined - pt did not awaken enough to follow commands for MMT, and did not note any spontaneous or purposeful movement   · LLE ROM: WFL except hip limited by pain with pt grimacing and resisting movement of the hip  · LLE Strength: unable to be determined due to pt's lethargy    Functional Mobility:  · Bed Mobility:     · Scooting: total assistance and of 2 persons  · Supine to Sit: total assistance, of 2 persons and HOB completely raised  · Sit to Supine: total assistance and of 2 persons  · Balance: fair - pt required  CGA for static sitting balance on the EOB.  At this point she only mildly awakened, but not enough to provide information.      Therapeutic Activities and Exercises:   PROM provided to B LE's at all joints including ankle DF/PF, knee flexion/extension, and hip ab/adduction all x 10 reps.     AM-PAC 6 CLICK MOBILITY  Total Score:8     Patient left HOB elevated with all lines intact, call button in reach and nurse Rosmery notified.    GOALS:   Multidisciplinary Problems     Physical Therapy Goals        Problem: Physical Therapy Goal    Goal Priority Disciplines Outcome Goal Variances Interventions   Physical Therapy Goal     PT, PT/OT Ongoing (interventions implemented as appropriate)     Description:  Goals to be met by: 2019     Patient will increase functional independence with mobility by performin. Supine to sit with MInimal Assistance  2. Sit to supine with MInimal Assistance  3. Sit to stand transfer with Moderate Assistance  4. Bed to chair transfer with Moderate Assistance using Rolling Walker  5. Lower extremity exercise program x10-20 reps per handout, with independence                      History:     Past Medical History:   Diagnosis Date    Arthritis     right hip and right knee    Atrial fibrillation     Complex renal cyst 2012    COPD (chronic obstructive pulmonary disease)     HTN (hypertension)     Hyperlipidemia     Narcolepsy without cataplexy(347.00)     Osteoarthritis of hip     Thyroid disease     Vitamin B12 deficiency     Got shots at Dr's office-last time was 6 months ago       Past Surgical History:   Procedure Laterality Date    APPENDECTOMY      pt claims with tubal pregnancy    CATARACT EXTRACTION      OU    ECTOPIC PREGNANCY SURGERY      EGD (ESOPHAGOGASTRODUODENOSCOPY) N/A 2012    Performed by [facility] at [facility]    EYE SURGERY      bilateral cataract    KNEE ARTHROSCOPY W/ MENISCAL REPAIR      left knee    MASS EXCISION  age 15     left shoulder       Time Tracking:     PT Received On: 08/19/19  PT Start Time: 1101     PT Stop Time: 1124  PT Total Time (min): 23 min     Billable Minutes: Evaluation 10 and Therapeutic Exercise 13      Laura Wells, PT  08/19/2019

## 2019-08-19 NOTE — PLAN OF CARE
Problem: Adult Inpatient Plan of Care  Goal: Plan of Care Review  Outcome: Ongoing (interventions implemented as appropriate)  02 saturation 90% on nc at 3lpm.  Increased to 4lpm to maintain 02 saturation of 92%.  Patient sleeping at this time.

## 2019-08-19 NOTE — PLAN OF CARE
Problem: Adult Inpatient Plan of Care  Goal: Plan of Care Review  Outcome: Ongoing (interventions implemented as appropriate)  Plan of care reviewed with ptfamily at beginning of shift.  Pt's family verbalized understanding (pt AOx1). Hourly/ Q2 hourly rounds completed on this pt throughout shift.  Pain monitored and covered when BP allowed (hypotensive for better half of the shift--NP aware), judge catheter in place draining clear yellow urine without issue--family refused removal of judge cath this AM (oncoming nurse notified), repositioned q2h as tolerated (not tolerating positioning to L side), safety maintained.  Patient has remained free from fall/injury, no skin breakdown noted.  Side rails up x2, bed in locked and lowest position, call light kept within reach.  Needs attended to, will continue to monitor/ update as indicated

## 2019-08-19 NOTE — PLAN OF CARE
Problem: Physical Therapy Goal  Goal: Physical Therapy Goal  Goals to be met by: 2019     Patient will increase functional independence with mobility by performin. Supine to sit with MInimal Assistance  2. Sit to supine with MInimal Assistance  3. Sit to stand transfer with Moderate Assistance  4. Bed to chair transfer with Moderate Assistance using Rolling Walker  5. Lower extremity exercise program x10-20 reps per handout, with independence    Outcome: Ongoing (interventions implemented as appropriate)  Pt required Total A +2 for transfer supine<>sit, and sit<>supine.  PT evaluation limited due to lethargy.

## 2019-08-19 NOTE — ASSESSMENT & PLAN NOTE
Patient underwent IM brigida insertion today in OR.  Will monitor post-op.  Physical therapy.  Morphine for pain control.  Enoxaparin for VTE prevention.

## 2019-08-19 NOTE — SUBJECTIVE & OBJECTIVE
Interval History:  Today had hip repair with placement of brigida.  Afterwards, she was groggy most of afternoon.  During lucid moments, she complained of pain.  Also a bit of anxiety.  Only 200 ml of urine produced today since this morning.    Review of Systems   Constitutional: Positive for fatigue (chronic). Negative for chills and fever.   Respiratory: Positive for cough (chronic) and shortness of breath (chronic).    Cardiovascular: Negative for chest pain.   Gastrointestinal: Negative for abdominal pain, nausea and vomiting.   Genitourinary: Negative for flank pain and frequency.   Musculoskeletal: Positive for arthralgias (chronic). Negative for neck pain.     Objective:     Vital Signs (Most Recent):  Temp: 98.2 °F (36.8 °C) (08/18/19 2014)  Pulse: 103 (08/18/19 2014)  Resp: 18 (08/18/19 2014)  BP: (!) 106/56 (08/18/19 2014)  SpO2: 95 % (08/18/19 2014) Vital Signs (24h Range):  Temp:  [98 °F (36.7 °C)-98.4 °F (36.9 °C)] 98.2 °F (36.8 °C)  Pulse:  [] 103  Resp:  [10-18] 18  SpO2:  [93 %-100 %] 95 %  BP: (105-143)/(50-76) 106/56     Weight: 58.9 kg (129 lb 13.6 oz)  Body mass index is 21.61 kg/m².    Intake/Output Summary (Last 24 hours) at 8/18/2019 2134  Last data filed at 8/18/2019 1109  Gross per 24 hour   Intake 1000 ml   Output 625 ml   Net 375 ml      Physical Exam   Constitutional: She appears well-nourished. She appears lethargic. She is easily aroused.  Non-toxic appearance. She has a sickly appearance. No distress.   HENT:   Head: Atraumatic.   Mouth/Throat: Oropharynx is clear and moist.   Eyes: Conjunctivae are normal. Right eye exhibits no discharge. Left eye exhibits no discharge.   Neck: Neck supple. No JVD present. No thyromegaly present.   Cardiovascular: Normal rate and regular rhythm.   Pulmonary/Chest: Effort normal. No respiratory distress. She has decreased breath sounds. She has no wheezes. She has no rhonchi.   Abdominal: Soft. Bowel sounds are normal. She exhibits no distension and  no mass.   Musculoskeletal: She exhibits no edema.   Neurological: She is easily aroused. She appears lethargic. She is disoriented.   Skin: Skin is warm and dry. No rash noted.       Significant Labs: All pertinent labs within the past 24 hours have been reviewed.    Significant Imaging: I have reviewed all pertinent imaging results/findings within the past 24 hours.

## 2019-08-19 NOTE — PROGRESS NOTES
SHWETHA Jj made aware of pt's VS btwn 2338 and 0130 (ranging from 84/45--88/52)--see flowsheets .  No new orders given at this time.  Will continue to monitor

## 2019-08-19 NOTE — PROGRESS NOTES
Post-op    Pt sleeping quietly. Nursing reports she sat at bedside this am with PT.    Left hip - dressing CDI.    Plan - continue PT. Will need placement. WBAT.

## 2019-08-19 NOTE — PROGRESS NOTES
[facility] Hospital Medicine  Progress Note    Patient Name: Ruth Roach  MRN: [id number]  Patient Class: IP- Inpatient   Admission Date: 8/17/2019  Length of Stay: 1 days  Attending Physician: Denis Watters MD  Primary Care Provider: Shannon Romo MD        Subjective:     Principal Problem:<principal problem not specified>        HPI:  Pt presented to our ED today with left hip pain.  She fell yesterday at home.  Her family do not know how she fell but they suspect that she fell out of her bed.  She is on hospice for end-stage COPD.  She is oxygen-dependent.     Overview/Hospital Course:  No notes on file    Interval History:  Today had hip repair with placement of brigida.  Afterwards, she was groggy most of afternoon.  During lucid moments, she complained of pain.  Also a bit of anxiety.  Only 200 ml of urine produced today since this morning.    Review of Systems   Constitutional: Positive for fatigue (chronic). Negative for chills and fever.   Respiratory: Positive for cough (chronic) and shortness of breath (chronic).    Cardiovascular: Negative for chest pain.   Gastrointestinal: Negative for abdominal pain, nausea and vomiting.   Genitourinary: Negative for flank pain and frequency.   Musculoskeletal: Positive for arthralgias (chronic). Negative for neck pain.     Objective:     Vital Signs (Most Recent):  Temp: 98.2 °F (36.8 °C) (08/18/19 2014)  Pulse: 103 (08/18/19 2014)  Resp: 18 (08/18/19 2014)  BP: (!) 106/56 (08/18/19 2014)  SpO2: 95 % (08/18/19 2014) Vital Signs (24h Range):  Temp:  [98 °F (36.7 °C)-98.4 °F (36.9 °C)] 98.2 °F (36.8 °C)  Pulse:  [] 103  Resp:  [10-18] 18  SpO2:  [93 %-100 %] 95 %  BP: (105-143)/(50-76) 106/56     Weight: 58.9 kg (129 lb 13.6 oz)  Body mass index is 21.61 kg/m².    Intake/Output Summary (Last 24 hours) at 8/18/2019 8559  Last data filed at 8/18/2019 1109  Gross per 24 hour   Intake 1000 ml   Output 625 ml   Net 375 ml      Physical Exam    Constitutional: She appears well-nourished. She appears lethargic. She is easily aroused.  Non-toxic appearance. She has a sickly appearance. No distress.   HENT:   Head: Atraumatic.   Mouth/Throat: Oropharynx is clear and moist.   Eyes: Conjunctivae are normal. Right eye exhibits no discharge. Left eye exhibits no discharge.   Neck: Neck supple. No JVD present. No thyromegaly present.   Cardiovascular: Normal rate and regular rhythm.   Pulmonary/Chest: Effort normal. No respiratory distress. She has decreased breath sounds. She has no wheezes. She has no rhonchi.   Abdominal: Soft. Bowel sounds are normal. She exhibits no distension and no mass.   Musculoskeletal: She exhibits no edema.   Neurological: She is easily aroused. She appears lethargic. She is disoriented.   Skin: Skin is warm and dry. No rash noted.       Significant Labs: All pertinent labs within the past 24 hours have been reviewed.    Significant Imaging: I have reviewed all pertinent imaging results/findings within the past 24 hours.      Assessment/Plan:      Closed fracture of left hip  Patient underwent IM brigida insertion today in OR.  Will monitor post-op.  Physical therapy.  Morphine for pain control.  Enoxaparin for VTE prevention.    Thyroid disease  Continue her usual dose of Synthroid before breakfast.      Chronic respiratory failure with hypoxia, on home O2 therapy  Continue O2 supplementation.  Monitor o2 sats and work of breathing.  She is DNR.      HTN (hypertension)  Monitor BP.  Treat as follows:  Hypertension Medications at home            furosemide (LASIX) 20 MG tablet Take 20 mg by mouth 2 (two) times daily.        She is not on any BP medications at this time while admitted.      COPD (chronic obstructive pulmonary disease)  It is end-stage.  Use aerosol treatments as needed.        VTE Risk Mitigation (From admission, onward)        Ordered     enoxaparin injection 40 mg  Daily      08/18/19 1948     IP VTE HIGH RISK PATIENT   Once      08/18/19 1240     Place ISABELL hose  Until discontinued      08/18/19 1240     Place sequential compression device  Until discontinued      08/18/19 1240     Place sequential compression device  Until discontinued      08/17/19 2000                Denis Watters MD  Department of Hospital Medicine   [facility]

## 2019-08-20 PROBLEM — Z51.5 COMFORT MEASURES ONLY STATUS: Status: ACTIVE | Noted: 2019-08-20

## 2019-08-20 LAB
BASOPHILS # BLD AUTO: 0.03 K/UL (ref 0–0.2)
BASOPHILS NFR BLD: 0.3 % (ref 0–1.9)
DIFFERENTIAL METHOD: ABNORMAL
EOSINOPHIL # BLD AUTO: 0.2 K/UL (ref 0–0.5)
EOSINOPHIL NFR BLD: 1.8 % (ref 0–8)
ERYTHROCYTE [DISTWIDTH] IN BLOOD BY AUTOMATED COUNT: 13.2 % (ref 11.5–14.5)
FOLATE SERPL-MCNC: 19.1 NG/ML (ref 4–24)
HCT VFR BLD AUTO: 28.4 % (ref 37–48.5)
HGB BLD-MCNC: 8.8 G/DL (ref 12–16)
IMM GRANULOCYTES # BLD AUTO: 0.03 K/UL (ref 0–0.04)
IRON SERPL-MCNC: 11 UG/DL (ref 30–160)
LYMPHOCYTES # BLD AUTO: 0.8 K/UL (ref 1–4.8)
LYMPHOCYTES NFR BLD: 8.6 % (ref 18–48)
MCH RBC QN AUTO: 32 PG (ref 27–31)
MCHC RBC AUTO-ENTMCNC: 31 G/DL (ref 32–36)
MCV RBC AUTO: 103 FL (ref 82–98)
MONOCYTES # BLD AUTO: 1.3 K/UL (ref 0.3–1)
MONOCYTES NFR BLD: 13.9 % (ref 4–15)
NEUTROPHILS # BLD AUTO: 7.2 K/UL (ref 1.8–7.7)
NEUTROPHILS NFR BLD: 75.1 % (ref 38–73)
NRBC BLD-RTO: 0 /100 WBC
PLATELET # BLD AUTO: 195 K/UL (ref 150–350)
PMV BLD AUTO: 11.3 FL (ref 9.2–12.9)
RBC # BLD AUTO: 2.75 M/UL (ref 4–5.4)
SATURATED IRON: 5 % (ref 20–50)
TOTAL IRON BINDING CAPACITY: 232 UG/DL (ref 250–450)
TRANSFERRIN SERPL-MCNC: 157 MG/DL (ref 200–375)
VIT B12 SERPL-MCNC: 1243 PG/ML (ref 210–950)
WBC # BLD AUTO: 9.63 K/UL (ref 3.9–12.7)

## 2019-08-20 PROCEDURE — 27000221 HC OXYGEN, UP TO 24 HOURS

## 2019-08-20 PROCEDURE — 25000003 PHARM REV CODE 250: Performed by: HOSPITALIST

## 2019-08-20 PROCEDURE — 82746 ASSAY OF FOLIC ACID SERUM: CPT

## 2019-08-20 PROCEDURE — 83540 ASSAY OF IRON: CPT

## 2019-08-20 PROCEDURE — 12000002 HC ACUTE/MED SURGE SEMI-PRIVATE ROOM

## 2019-08-20 PROCEDURE — 82607 VITAMIN B-12: CPT

## 2019-08-20 PROCEDURE — 25000003 PHARM REV CODE 250: Performed by: INTERNAL MEDICINE

## 2019-08-20 PROCEDURE — 36415 COLL VENOUS BLD VENIPUNCTURE: CPT

## 2019-08-20 PROCEDURE — 94761 N-INVAS EAR/PLS OXIMETRY MLT: CPT

## 2019-08-20 PROCEDURE — 63600175 PHARM REV CODE 636 W HCPCS: Performed by: NURSE PRACTITIONER

## 2019-08-20 PROCEDURE — 85025 COMPLETE CBC W/AUTO DIFF WBC: CPT

## 2019-08-20 PROCEDURE — 97530 THERAPEUTIC ACTIVITIES: CPT | Performed by: PHYSICAL THERAPIST

## 2019-08-20 RX ORDER — KETOROLAC TROMETHAMINE 30 MG/ML
30 INJECTION, SOLUTION INTRAMUSCULAR; INTRAVENOUS ONCE
Status: COMPLETED | OUTPATIENT
Start: 2019-08-20 | End: 2019-08-20

## 2019-08-20 RX ORDER — FENTANYL 25 UG/1
1 PATCH TRANSDERMAL
Status: DISCONTINUED | OUTPATIENT
Start: 2019-08-20 | End: 2019-08-21 | Stop reason: HOSPADM

## 2019-08-20 RX ADMIN — LORAZEPAM 1 MG: 2 SOLUTION, CONCENTRATE ORAL at 03:08

## 2019-08-20 RX ADMIN — MORPHINE SULFATE 10 MG: 10 SOLUTION ORAL at 03:08

## 2019-08-20 RX ADMIN — MORPHINE SULFATE 10 MG: 10 SOLUTION ORAL at 11:08

## 2019-08-20 RX ADMIN — KETOROLAC TROMETHAMINE 30 MG: 30 INJECTION, SOLUTION INTRAMUSCULAR at 08:08

## 2019-08-20 RX ADMIN — MORPHINE SULFATE 10 MG: 10 SOLUTION ORAL at 06:08

## 2019-08-20 RX ADMIN — FENTANYL 1 PATCH: 25 PATCH, EXTENDED RELEASE TRANSDERMAL at 01:08

## 2019-08-20 RX ADMIN — LORAZEPAM 1 MG: 2 SOLUTION, CONCENTRATE ORAL at 11:08

## 2019-08-20 RX ADMIN — IBUPROFEN 600 MG: 200 SUSPENSION ORAL at 07:08

## 2019-08-20 RX ADMIN — MORPHINE SULFATE 10 MG: 10 SOLUTION ORAL at 07:08

## 2019-08-20 NOTE — PLAN OF CARE
Problem: Adult Inpatient Plan of Care  Goal: Plan of Care Review  Outcome: Ongoing (interventions implemented as appropriate)  Plan of care reviewed with pt/family at beginning of shift-prn morphine and ativan orders adjusted.  Pt/family verbalized understanding. Hourly/ Q2 hourly rounds completed on this pt throughout shift.  Pain monitored and covered as needed, judge cath in place draining minimal urine, repositioned q2h as tolerated, safety maintained.  Patient has remained free from fall/injury, no skin breakdown noted.  Side rails up x2, bed in locked and lowest position, call light kept within reach.    This am around 0330 pt became restless, agitated, calling out for help.  PRN doses of morphine and ativan administered with positive effect after about 30-45min.  3h later pt restless again calling out for help and trying to pull at judge and IV lines.  JOAQUIN Barbour notified and request for one time dose of morphine asked for.  Awaiting response.  Daughter updated  Needs attended to, will continue to monitor/ update as indicated

## 2019-08-20 NOTE — PLAN OF CARE
Problem: Physical Therapy Goal  Goal: Physical Therapy Goal  Goals to be met by: 2019     Patient will increase functional independence with mobility by performin. Supine to sit with MInimal Assistance  2. Sit to supine with MInimal Assistance  3. Sit to stand transfer with Moderate Assistance  4. Bed to chair transfer with Moderate Assistance using Rolling Walker  5. Lower extremity exercise program x10-20 reps per handout, with independence     Outcome: Ongoing (interventions implemented as appropriate)  Pt requires total A for transfer supine<>sit, and she was able to sit on the EOB for 10 min with CGA/Min A.

## 2019-08-20 NOTE — PT/OT/SLP PROGRESS
"Physical Therapy Treatment    Patient Name:  Ruth Roach   MRN:  [id number]    Recommendations:     Discharge Recommendations:  home with hospice, home health PT(pt will need continued 24/7 care)   Discharge Equipment Recommendations: none   Barriers to discharge: None    Assessment:     Ruth Roach is a 89 y.o. female admitted with a medical diagnosis of <principal problem not specified>.  She presents with the following impairments/functional limitations:  weakness, impaired self care skills, impaired balance, decreased safety awareness, impaired endurance, impaired functional mobilty, decreased upper extremity function, pain, impaired cognition, decreased lower extremity function, orthopedic precautions.  Pt remains lethargic during tx today.  She tolerated PROM to B LE's, and then required Total A +2 for transfer supine<>sit on the EOB.  She required CGA/Min A for static sitting balance today, but was able to tolerate sitting for 10 min.  At the end of the tx session, pt was able to state "I can hear what you are saying."  And then when asked if she wanted to lay down, she stated "I think that would be good."     Rehab Prognosis: Poor; patient would benefit from acute skilled PT services to address these deficits and reach maximum level of function.    Recent Surgery: Procedure(s) (LRB):  ORIF, FRACTURE, FEMUR, INTERTROCHANTERIC (Left) 2 Days Post-Op    Plan:     During this hospitalization, patient to be seen daily to address the identified rehab impairments via therapeutic activities, therapeutic exercises and progress toward the following goals:    · Plan of Care Expires:  09/02/19    Subjective     Chief Complaint: pt does not speak much during tx  Patient/Family Comments/goals: none stated, but likely to return home  Pain/Comfort:  · Pain Rating 1: (Pt did not rate)  · Location - Side 1: Left  · Location 1: hip  · Pain Addressed 1: Reposition, Distraction, Nurse notified      Objective:     Communicated " with nurse Jimenez prior to session.  Patient found HOB elevated with oxygen, peripheral IV, telemetry, SCD upon PT entry to room.     General Precautions: Standard, fall   Orthopedic Precautions:LLE weight bearing as tolerated   Braces: N/A     Functional Mobility:  · Bed Mobility:     · Supine to Sit: total assistance and of 2 persons  · Sit to Supine: total assistance and of 2 persons  · Balance: fair      AM-PAC 6 CLICK MOBILITY  Turning over in bed (including adjusting bedclothes, sheets and blankets)?: 2  Sitting down on and standing up from a chair with arms (e.g., wheelchair, bedside commode, etc.): 1  Moving from lying on back to sitting on the side of the bed?: 2  Moving to and from a bed to a chair (including a wheelchair)?: 1  Need to walk in hospital room?: 1  Climbing 3-5 steps with a railing?: 1  Basic Mobility Total Score: 8       Therapeutic Activities and Exercises:   PROM to B LE's including AP's, heel slides, hip ab/adduction x 5-10 reps     Patient left HOB elevated with all lines intact, call button in reach, nurse Jimenez notified and DIL present..    GOALS:   Multidisciplinary Problems     Physical Therapy Goals        Problem: Physical Therapy Goal    Goal Priority Disciplines Outcome Goal Variances Interventions   Physical Therapy Goal     PT, PT/OT Ongoing (interventions implemented as appropriate)     Description:  Goals to be met by: 2019     Patient will increase functional independence with mobility by performin. Supine to sit with MInimal Assistance  2. Sit to supine with MInimal Assistance  3. Sit to stand transfer with Moderate Assistance  4. Bed to chair transfer with Moderate Assistance using Rolling Walker  5. Lower extremity exercise program x10-20 reps per handout, with independence                      Time Tracking:     PT Received On: 19  PT Start Time: 940     PT Stop Time: 1010  PT Total Time (min): 30 min     Billable Minutes: Therapeutic Activity  30    Treatment Type: Treatment  PT/PTA: PT           Laura Wells, PT  08/20/2019

## 2019-08-20 NOTE — PROGRESS NOTES
Progress Note  Hospital Medicine  Patient Name:Ruth Roach  MRN:  [id number]  Patient Class: IP- Inpatient  Admit Date: 8/17/2019  Length of Stay: 3 days  Expected Discharge Date:   Attending Physician: Anaid Contreras MD  Primary Care Provider:  Shannon Romo MD    SUBJECTIVE:     Principal Problem:   Initial history of present illness: Pt presented to our ED today with left hip pain.  She fell yesterday at home.  Her family do not know how she fell but they suspect that she fell out of her bed.  She is on hospice for end-stage COPD.  She is oxygen-dependent.     PMH/PSH/SH/FH/Meds: reviewed.    Symptoms/Review of Systems: Patient is status post left femur open reduction internal fixation/nailing. No shortness of breath, cough, chest pain or headache, fever or abdominal pain. Patient's son and daughter are at bedside.  Patient was previously with [facility] at home.  Diet:  Adequate intake.    Activity level: Up with assistance  Pain: Controlled     OBJECTIVE:   Vital Signs (Most Recent):      Temp: 96.6 °F (35.9 °C) (08/20/19 0823)  Pulse: 91 (08/20/19 0823)  Resp: 18 (08/20/19 0823)  BP: (!) 97/55 (08/20/19 0823)  SpO2: 95 % (08/20/19 0823)       Vital Signs Range (Last 24H):  Temp:  [96.6 °F (35.9 °C)-98.2 °F (36.8 °C)]   Pulse:  [87-96]   Resp:  [14-18]   BP: ()/(51-55)   SpO2:  [90 %-95 %]     Weight: 58.9 kg (129 lb 13.6 oz)  Body mass index is 21.61 kg/m².    Intake/Output Summary (Last 24 hours) at 8/20/2019 1015  Last data filed at 8/20/2019 0300  Gross per 24 hour   Intake --   Output 300 ml   Net -300 ml     Physical Examination:  Constitutional: She appears well-nourished. She appears lethargic. She is easily aroused.  Non-toxic appearance. She has a sickly appearance. No distress.   HENT:   Head: Atraumatic.   Mouth/Throat: Oropharynx is clear and moist.   Eyes: Conjunctivae are normal. Right eye exhibits no discharge. Left eye exhibits no discharge.   Neck: Neck supple. No JVD present. No  thyromegaly present.   Cardiovascular: Normal rate and regular rhythm.   Pulmonary/Chest: Effort normal. No respiratory distress. She has decreased breath sounds. She has no wheezes. She has no rhonchi.   Abdominal: Soft. Bowel sounds are normal. She exhibits no distension and no mass.   Musculoskeletal: She exhibits no edema.  Left hip dressing clean dry and intact.  Neurological:  Sleeping/sedated.  Skin: Skin is warm and dry. No rash noted.     CBC:  Recent Labs   Lab 08/17/19  1611 08/19/19  0538 08/20/19  0544   WBC 8.43 8.84 9.63   RBC 3.67* 2.91* 2.75*   HGB 11.9* 9.2* 8.8*   HCT 36.9* 29.6* 28.4*    208 195   * 102* 103*   MCH 32.4* 31.6* 32.0*   MCHC 32.2 31.1* 31.0*   BMP  Recent Labs   Lab 08/17/19  1610 08/19/19  0538   * 100    140   K 4.1 3.8   CL 99 99   CO2 29 32*   BUN 17 28*   CREATININE 0.9 1.1   CALCIUM 9.6 8.6*      Diagnostic Results:  Microbiology Results (last 7 days)     ** No results found for the last 168 hours. **         CXR: No acute cardiopulmonary abnormality.  Query COPD.    CT head without contrast: No acute intracranial abnormality.  Mild involutional change and white matter disease.    CT C-spine:   1. Straightening of normal cervical lordosis and grade 1 anterolisthesis C7 on T1.  2. No acute fracture.  Complete fusion of the C4 and C5 levels.  3. Multilevel degenerative change contributing to areas of spinal canal and bony neural foraminal narrowing as described.    X-ray pelvis: Comminuted angulated intertrochanteric fracture of the left femur.    Assessment/Plan:   Closed fracture of left hip s/p status post left femur open reduction internal fixation/nailing  POD # 2.  Will monitor post-op.  Physical therapy.  Morphine for pain control.  Enoxaparin for VTE prevention.     Thyroid disease  Continue her usual dose of Synthroid before breakfast.    Chronic respiratory failure with hypoxia, on home O2 therapy  Continue O2 supplementation.  Monitor o2  sats and work of breathing.  She is DNR.     HTN (hypertension)  Monitor BP.  Treat as follows:        Hypertension Medications at home                furosemide (LASIX) 20 MG tablet Take 20 mg by mouth 2 (two) times daily.          She is not on any BP medications at this time while admitted.     COPD (chronic obstructive pulmonary disease)  It is end-stage.  Use aerosol treatments as needed.    Postop anemia - acute surgical blood loss anemia  Monitor serial CBC and transfuse if patient becomes hemodynamically unstable, symptomatic or H/H drops below 7/21.    I had a lengthy discussion regarding end of life issues with patient's daughter and son who are requesting comfort care to be initiated.  They will like resumption of home hospice tomorrow with [facility].  Discussed with .  Will abide by their wishes.  See orders for details.  Discussed with patient's registered nurse.  Time spent in care of the patient, counseling and coordination of care (Greater than 50% spent in direct face to face contact): 35 min.      VTE Risk Mitigation (From admission, onward)        Ordered     enoxaparin injection 40 mg  Daily      08/18/19 1948     IP VTE HIGH RISK PATIENT  Once      08/18/19 1240     Place ISABELL hose  Until discontinued      08/18/19 1240     Place sequential compression device  Until discontinued      08/18/19 1240     Place sequential compression device  Until discontinued      08/17/19 2000        Anaid Contreras MD  Department of Hospital Medicine   [facility]

## 2019-08-20 NOTE — SUBJECTIVE & OBJECTIVE
Interval History:  Today had hip repair with placement of brigida.  Afterwards, she was groggy most of afternoon.  During lucid moments, she complained of pain.  Also a bit of anxiety.  Only 200 ml of urine produced today since this morning.    Review of Systems   Unable to perform ROS: Dementia (I obtained ROS yesterday and day before from family)     Objective:     Vital Signs (Most Recent):  Temp: 98.2 °F (36.8 °C) (08/19/19 2023)  Pulse: 88 (08/19/19 2023)  Resp: 15 (08/19/19 2023)  BP: (!) 99/54 (08/19/19 2023)  SpO2: 95 % (08/19/19 2023) Vital Signs (24h Range):  Temp:  [97.1 °F (36.2 °C)-98.2 °F (36.8 °C)] 98.2 °F (36.8 °C)  Pulse:  [87-95] 88  Resp:  [14-18] 15  SpO2:  [90 %-98 %] 95 %  BP: ()/(48-54) 99/54     Weight: 58.9 kg (129 lb 13.6 oz)  Body mass index is 21.61 kg/m².    Intake/Output Summary (Last 24 hours) at 8/19/2019 2356  Last data filed at 8/19/2019 0645  Gross per 24 hour   Intake 1470.83 ml   Output 450 ml   Net 1020.83 ml      Physical Exam   Constitutional: She appears well-nourished. She appears lethargic. She is easily aroused.  Non-toxic appearance. She has a sickly appearance. No distress.   HENT:   Head: Atraumatic.   Mouth/Throat: Oropharynx is clear and moist.   Eyes: Conjunctivae are normal. Right eye exhibits no discharge. Left eye exhibits no discharge.   Neck: Neck supple. No JVD present. No thyromegaly present.   Cardiovascular: Normal rate and regular rhythm.   Pulmonary/Chest: Effort normal. No respiratory distress. She has decreased breath sounds. She has no wheezes. She has no rhonchi.   Abdominal: Soft. Bowel sounds are normal. She exhibits no distension and no mass.   Musculoskeletal: She exhibits no edema.   Neurological: She is easily aroused. She appears lethargic. She is disoriented.   Skin: Skin is warm and dry. No rash noted.       Significant Labs: All pertinent labs within the past 24 hours have been reviewed.    Significant Imaging: I have reviewed all pertinent  imaging results/findings within the past 24 hours.

## 2019-08-20 NOTE — PLAN OF CARE
Delvin sent the Hospice referral to [facility] via [facility] and spoke with Gloria.  Delvin informed Gloria, about pt's family requesting an air mattress.  3:30m  Son signed the Pt's Choice Disclosure Form.       08/20/19 8392   Post-Acute Status   Post-Acute Authorization Home Health/Hospice   Home Health/Hospice Status Referrals Sent

## 2019-08-20 NOTE — PLAN OF CARE
Problem: Adult Inpatient Plan of Care  Goal: Plan of Care Review  Outcome: Ongoing (interventions implemented as appropriate)  Pt is aaox1. She has slept most of day. Md has change her to comfort measures and will go home on hospice tomorrow. Pt family remains at bedside. Skin intact no redness or open wounds noted this shift. Pt IV patent. Fall precautions maintained. Bed low call bell with in reach. Pt has been turned q 2 hrs and propped up on pillows. teds and scds in place. Telemetry remains with NSR. Calderon in place with clear yellow urine noted.

## 2019-08-20 NOTE — ASSESSMENT & PLAN NOTE
Patient underwent IM brigida insertion yesterday in OR.  Will monitor post-op.  Physical therapy.  Morphine for pain control.  Enoxaparin for VTE prevention.

## 2019-08-20 NOTE — PROGRESS NOTES
[facility] Hospital Medicine  Progress Note    Patient Name: Ruth Roach  MRN: [id number]  Patient Class: IP- Inpatient   Admission Date: 8/17/2019  Length of Stay: 2 days  Attending Physician: Denis Watters MD  Primary Care Provider: Shannon Romo MD        Subjective:     Principal Problem:<principal problem not specified>        HPI:  Pt presented to our ED today with left hip pain.  She fell yesterday at home.  Her family do not know how she fell but they suspect that she fell out of her bed.  She is on hospice for end-stage COPD.  She is oxygen-dependent.     Overview/Hospital Course:  No notes on file    Interval History:  Today had hip repair with placement of brigida.  Afterwards, she was groggy most of afternoon.  During lucid moments, she complained of pain.  Also a bit of anxiety.  Only 200 ml of urine produced today since this morning.    Review of Systems   Unable to perform ROS: Dementia (I obtained ROS yesterday and day before from family)     Objective:     Vital Signs (Most Recent):  Temp: 98.2 °F (36.8 °C) (08/19/19 2023)  Pulse: 88 (08/19/19 2023)  Resp: 15 (08/19/19 2023)  BP: (!) 99/54 (08/19/19 2023)  SpO2: 95 % (08/19/19 2023) Vital Signs (24h Range):  Temp:  [97.1 °F (36.2 °C)-98.2 °F (36.8 °C)] 98.2 °F (36.8 °C)  Pulse:  [87-95] 88  Resp:  [14-18] 15  SpO2:  [90 %-98 %] 95 %  BP: ()/(48-54) 99/54     Weight: 58.9 kg (129 lb 13.6 oz)  Body mass index is 21.61 kg/m².    Intake/Output Summary (Last 24 hours) at 8/19/2019 5133  Last data filed at 8/19/2019 0645  Gross per 24 hour   Intake 1470.83 ml   Output 450 ml   Net 1020.83 ml      Physical Exam   Constitutional: She appears well-nourished. She appears lethargic. She is easily aroused.  Non-toxic appearance. She has a sickly appearance. No distress.   HENT:   Head: Atraumatic.   Mouth/Throat: Oropharynx is clear and moist.   Eyes: Conjunctivae are normal. Right eye exhibits no discharge. Left eye exhibits no  discharge.   Neck: Neck supple. No JVD present. No thyromegaly present.   Cardiovascular: Normal rate and regular rhythm.   Pulmonary/Chest: Effort normal. No respiratory distress. She has decreased breath sounds. She has no wheezes. She has no rhonchi.   Abdominal: Soft. Bowel sounds are normal. She exhibits no distension and no mass.   Musculoskeletal: She exhibits no edema.   Neurological: She is easily aroused. She appears lethargic. She is disoriented.   Skin: Skin is warm and dry. No rash noted.       Significant Labs: All pertinent labs within the past 24 hours have been reviewed.    Significant Imaging: I have reviewed all pertinent imaging results/findings within the past 24 hours.      Assessment/Plan:      Closed fracture of left hip  Patient underwent IM brigida insertion yesterday in OR.  Will monitor post-op.  Physical therapy.  Morphine for pain control.  Enoxaparin for VTE prevention.    Thyroid disease  Continue her usual dose of Synthroid before breakfast.      Chronic respiratory failure with hypoxia, on home O2 therapy  Continue O2 supplementation.  Monitor o2 sats and work of breathing.  She is DNR.      HTN (hypertension)  Monitor BP.  Treat as follows:  Hypertension Medications at home            furosemide (LASIX) 20 MG tablet Take 20 mg by mouth 2 (two) times daily.        She is not on any BP medications at this time while admitted.      COPD (chronic obstructive pulmonary disease)  It is end-stage.  Use aerosol treatments as needed.        VTE Risk Mitigation (From admission, onward)        Ordered     enoxaparin injection 40 mg  Daily      08/18/19 1948     IP VTE HIGH RISK PATIENT  Once      08/18/19 1240     Place ISABELL hose  Until discontinued      08/18/19 1240     Place sequential compression device  Until discontinued      08/18/19 1240     Place sequential compression device  Until discontinued      08/17/19 2000                Denis Watters MD  Department of Hospital Medicine    [facility]

## 2019-08-21 VITALS
BODY MASS INDEX: 21.64 KG/M2 | HEART RATE: 85 BPM | SYSTOLIC BLOOD PRESSURE: 97 MMHG | DIASTOLIC BLOOD PRESSURE: 50 MMHG | TEMPERATURE: 97 F | OXYGEN SATURATION: 96 % | RESPIRATION RATE: 18 BRPM | HEIGHT: 65 IN | WEIGHT: 129.88 LBS

## 2019-08-21 PROCEDURE — 63600175 PHARM REV CODE 636 W HCPCS: Performed by: INTERNAL MEDICINE

## 2019-08-21 PROCEDURE — 25000003 PHARM REV CODE 250: Performed by: HOSPITALIST

## 2019-08-21 PROCEDURE — 27000221 HC OXYGEN, UP TO 24 HOURS

## 2019-08-21 RX ORDER — ASPIRIN 325 MG
325 TABLET ORAL 2 TIMES DAILY
Refills: 0
Start: 2019-08-21 | End: 2019-09-20

## 2019-08-21 RX ORDER — MORPHINE SULFATE 2 MG/ML
2 INJECTION, SOLUTION INTRAMUSCULAR; INTRAVENOUS ONCE
Status: COMPLETED | OUTPATIENT
Start: 2019-08-21 | End: 2019-08-21

## 2019-08-21 RX ADMIN — MORPHINE SULFATE 2 MG: 2 INJECTION, SOLUTION INTRAMUSCULAR; INTRAVENOUS at 06:08

## 2019-08-21 RX ADMIN — MORPHINE SULFATE 10 MG: 10 SOLUTION ORAL at 12:08

## 2019-08-21 RX ADMIN — LORAZEPAM 1 MG: 2 SOLUTION, CONCENTRATE ORAL at 05:08

## 2019-08-21 RX ADMIN — LORAZEPAM 1 MG: 2 SOLUTION, CONCENTRATE ORAL at 04:08

## 2019-08-21 RX ADMIN — MORPHINE SULFATE 10 MG: 10 SOLUTION ORAL at 05:08

## 2019-08-21 RX ADMIN — MORPHINE SULFATE 10 MG: 10 SOLUTION ORAL at 01:08

## 2019-08-21 RX ADMIN — MORPHINE SULFATE 10 MG: 10 SOLUTION ORAL at 09:08

## 2019-08-21 RX ADMIN — MORPHINE SULFATE 10 MG: 10 SOLUTION ORAL at 03:08

## 2019-08-21 NOTE — PLAN OF CARE
Delvin spoke with Dilia at [facility] [phone].  Pt is accepted back with [facility] and pt can discharge to home.  The air mattress was delivered yesterday.  Dilia asked, if family can call when they are leaving the hospital, so the nurse can meet them at home.  Delvin informed ZEUS Rolle.       08/21/19 1027   Post-Acute Status   Post-Acute Authorization Home Health/Hospice   Post-Acute Placement Status Set-up Complete

## 2019-08-21 NOTE — PLAN OF CARE
08/21/19 0833   PRE-TX-O2   O2 Device (Oxygen Therapy) nasal cannula   $ Is the patient on Low Flow Oxygen? Yes   Flow (L/min) 4

## 2019-08-21 NOTE — PT/OT/SLP DISCHARGE
Physical Therapy Discharge Summary    Name: Ruth Roach  MRN: [id number]   Principal Problem: <principal problem not specified>     Patient Discharged from acute Physical Therapy on 2019 due to transition to comfort measures and pt preparing to discharge home on hospice.  Please refer to prior PT noted date on 2019 for functional status.     Assessment:       Objective:     GOALS:   Multidisciplinary Problems     Physical Therapy Goals        Problem: Physical Therapy Goal    Goal Priority Disciplines Outcome Goal Variances Interventions   Physical Therapy Goal     PT, PT/OT Ongoing (interventions implemented as appropriate)     Description:  Goals to be met by: 2019     Patient will increase functional independence with mobility by performin. Supine to sit with MInimal Assistance  2. Sit to supine with MInimal Assistance  3. Sit to stand transfer with Moderate Assistance  4. Bed to chair transfer with Moderate Assistance using Rolling Walker  5. Lower extremity exercise program x10-20 reps per handout, with independence                      Reasons for Discontinuation of Therapy Services  Pt is now on comfort measures      Plan:     Patient Discharged to: Palliative Care/Hospice.    Laura Wells, PT  2019

## 2019-08-21 NOTE — NURSING
Pt's family is requesting morphine be given at this time and well as Ativan.  Pharmacy was phoned to prepare PRN Ativan and nurse is to pick it up for sign out and waste from pharmacy.  R Hand PIV was removed per family members request.

## 2019-08-21 NOTE — DISCHARGE SUMMARY
Discharge Summary  Hospital Medicine    Admit Date: 8/17/2019    Date and Time: 8/21/20199:46 AM    Discharge Attending Physician: Anaid Contreras MD    Primary Care Physician: Shannon Romo MD    Diagnoses:  Active Hospital Problems    Diagnosis  POA    *Closed fracture of left hip s/p status post left femur open reduction internal fixation/nailing  Yes    Comfort measures only status [Z51.5]  Not Applicable    Chronic respiratory failure with hypoxia, on home O2 therapy [J96.11, Z99.81]  Not Applicable    Thyroid disease [E07.9]  Yes    COPD (chronic obstructive pulmonary disease) [J44.9]  Yes    HTN (hypertension) [I10]  Yes     Discharged Condition: Good    Hospital Course:   Patient is a 89-year-old  female with past medical history significant for hypertension, hyperlipidemia, chronic obstructive pulmonary disease, chronic hypoxemic respiratory failure, chronic atrial fibrillation, osteoarthritis and hypothyroidism.  Pt presented to our ED with left hip pain.  She fell previous day at home.  Her family do not know how she fell but they suspect that she fell out of her bed.  She is on hospice for end-stage COPD.  She is oxygen-dependent.  X-ray pelvis showed comminuted angulated intertrochanteric fracture of left femur.  Patient was admitted to Hospitalist medicine service. Patient was evaluated by Dr. Mace who performed left femur open reduction internal fixation/nailing procedure.  Postoperatively, patient was in excruciating discomfort and pain.  Patient's family requested comfort care measures which were initiated.  Patient was previously on home hospice with [facility] which is being resumed upon discharge as per patient's family request and wishes. Patient was discharged home in stable condition with following discharge plan of care.     Consults: Dr. Mace    Significant Diagnostic Studies:   CXR: No acute cardiopulmonary abnormality.  Query COPD.     CT head without contrast:  No acute intracranial abnormality.  Mild involutional change and white matter disease.     CT C-spine:   1. Straightening of normal cervical lordosis and grade 1 anterolisthesis C7 on T1.  2. No acute fracture.  Complete fusion of the C4 and C5 levels.  3. Multilevel degenerative change contributing to areas of spinal canal and bony neural foraminal narrowing as described.     X-ray pelvis: Comminuted angulated intertrochanteric fracture of the left femur.    Microbiology Results (last 7 days)     ** No results found for the last 168 hours. **        Special Treatments/Procedures: as above  Disposition: Home with Hospice    Medications:  Reconciled Home Medications:   Current Discharge Medication List      START taking these medications    Details   aspirin 325 MG tablet Take 1 tablet (325 mg total) by mouth 2 (two) times daily.  Refills: 0         CONTINUE these medications which have NOT CHANGED    Details   ALPRAZolam (XANAX) 0.25 MG tablet Take by mouth 3 (three) times daily.      azelastine (ASTELIN) 137 mcg (0.1 %) nasal spray 1 spray (137 mcg total) by Nasal route 2 (two) times daily.  Qty: 30 mL, Refills: 2      HYDROcodone-acetaminophen (NORCO) 5-325 mg per tablet Take 1 tablet by mouth every 6 (six) hours as needed for Pain.      levothyroxine (SYNTHROID) 50 MCG tablet Take 1 tablet (50 mcg total) by mouth once daily.  Qty: 90 tablet, Refills: 3    Associated Diagnoses: Thyroid disease      sennosides/docusate sodium (SENNA PLUS ORAL) Take by mouth.      acetaminophen (TYLENOL) 650 MG Supp Place rectally.      albuterol-ipratropium (DUO-NEB) 2.5 mg-0.5 mg/3 mL nebulizer solution Inhale into the lungs.      ascorbic acid (VITAMIN C) 500 MG tablet Take 500 mg by mouth once daily.        bisacodyl (DULCOLAX) 10 mg Supp Place 10 mg rectally daily as needed.      fluticasone-vilanterol (BREO ELLIPTA) 200-25 mcg/dose DsDv diskus inhaler Inhale 1 puff into the lungs once daily.  Qty: 1 each, Refills: 11       ginseng 100 mg Cap Take 1 tablet by mouth daily as needed.        hyoscyamine (ANASPAZ,LEVSIN) 0.125 mg Tab Take 125 mcg by mouth every 4 (four) hours as needed.      loperamide (IMODIUM) 2 mg capsule Take 2 mg by mouth 4 (four) times daily as needed.        lorazepam 2 mg/ml oral conc (ATIVAN) 2 mg/mL Conc Take by mouth.      methylphenidate HCl (RITALIN) 20 MG tablet Take 1 tablet (20 mg total) by mouth 3 (three) times daily with meals.  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      multivitamin capsule Take 1 capsule by mouth once daily.        prochlorperazine (COMPAZINE) 10 MG tablet Take 10 mg by mouth every 6 (six) hours as needed.      traMADol (ULTRAM) 50 mg tablet Take 1 tablet (50 mg total) by mouth every 12 (twelve) hours as needed for Pain.  Qty: 45 tablet, Refills: 3    Associated Diagnoses: Effusion of bursa of right knee; Primary osteoarthritis of both knees         STOP taking these medications       ciprofloxacin HCl (CIPRO) 500 MG tablet Comments:   Reason for Stopping:         furosemide (LASIX) 20 MG tablet Comments:   Reason for Stopping:             Discharge Procedure Orders   Ambulatory referral to Outpatient Case Management   Referral Priority: Routine Referral Type: Consultation   Referral Reason: Specialty Services Required   Number of Visits Requested: 1     Diet Cardiac   Order Comments: Boost plus with meals     Other restrictions (specify):   Order Comments: PLEASE OBSERVE FALL PRECAUTIONS, Hip Precautions     Call MD for:   Order Comments: For worsening symptoms, chest pain, shortness of breath, increased abdominal pain, high grade fever, stroke or stroke like symptoms, immediately go to the nearest Emergency Room or call 911 as soon as possible.     Follow-up Information     Shannon Romo MD.    Specialty:  Internal Medicine  Why:  As  needed  Contact information:  [address] [facility]  [address]  [facility] [address]  [phone]             John Mace MD In 2 weeks.    Specialties:  Sports Medicine, Orthopedic Surgery  Contact information:  [address] [place] [address]  [address]  [facility] [address] [phone]                 Time spent on the discharge of patient: 32 minutes  Patient was seen and examined on the date of discharge and determined to be suitable for discharge.

## 2019-08-21 NOTE — PLAN OF CARE
Delvin spoke with Gifty with [facility], she has all the paper work signed and pt is cleared for discharged.  Delvin provide the ambulance sheet to ZEUS Rolle.       08/21/19 1220   Final Note   Assessment Type Final Discharge Note   Anticipated Discharge Disposition [facility]  ([facility])

## 2019-08-21 NOTE — PT/OT/SLP PROGRESS
Occupational Therapy      Patient Name:  Ruth Roach   MRN:  [id number]    Patient not seen today secondary to being transferred to hospice care and nurse, Sariah stating patient was to receive comfort care only. OT orders to be discontinued.      MARYANA Medellin  8/21/2019

## 2019-08-21 NOTE — NURSING
Pt being dc'd to care of family will be going home on hospice. Will leave hospital via ambulance transport with family at side.

## 2019-08-21 NOTE — NURSING
"Pt continues to moan and fidget in the bed with eyes closed while moaning  "Oh please, Oh no, Oh please help me" over and over again.  The pt's daughter is at bedside and tearful requesting that the pt possibly have her morphine changed to IV.  SILVINA Azevedo was messaged via secure chat requesting a dose of IV morphine.  An order for 1x dose of morphine 2 mg IV was obtained and given.  Nurse will monitor pt's pain level.   "

## 2019-08-22 NOTE — NURSING
Pt dc to care of family  Left per ambulance with 2 person escort daughter at side. Iv removed Pt going home with hospice.

## 2019-08-29 ENCOUNTER — OUTPATIENT CASE MANAGEMENT (OUTPATIENT)
Dept: ADMINISTRATIVE | Facility: OTHER | Age: 84
End: 2019-08-29

## 2019-08-29 NOTE — PROGRESS NOTES
08/29/19-  Called [facility] at [phone] and spoke to Brynn. Patient was admitted to the services of [facility] on 08/21/19.

## 2020-09-29 ENCOUNTER — PATIENT MESSAGE (OUTPATIENT)
Dept: OTHER | Facility: OTHER | Age: 85
End: 2020-09-29

## 2020-12-11 ENCOUNTER — PATIENT MESSAGE (OUTPATIENT)
Dept: OTHER | Facility: OTHER | Age: 85
End: 2020-12-11

## 2022-06-07 NOTE — TELEPHONE ENCOUNTER
Notified Jumana Roach that prescription for pt is ready to be picked up at our office. She verbalized understanding   [FreeTextEntry2] : new patient

## (undated) DEVICE — Device

## (undated) DEVICE — GLOVE SURG ULTRA TOUCH 8.5

## (undated) DEVICE — LINER SUCTION 3000CC

## (undated) DEVICE — REAMER STEPPED DHS DCS

## (undated) DEVICE — BIT DRILL QCK-CPLG 4.2MM

## (undated) DEVICE — SEE MEDLINE ITEM 146292

## (undated) DEVICE — DRAPE C ARM 42 X 120 10/BX

## (undated) DEVICE — DRAPE IOBAN 2 STERI

## (undated) DEVICE — SPONGE SUPER KERLIX 6X6.75IN

## (undated) DEVICE — STAPLER SKIN ROTATING HEAD

## (undated) DEVICE — ELECTRODE REM PLYHSV RETURN 9

## (undated) DEVICE — SCREW STRDRV REC T25 5X44 TTNM
Type: IMPLANTABLE DEVICE | Site: HIP | Status: NON-FUNCTIONAL
Removed: 2019-08-18

## (undated) DEVICE — DRESSING GAUZE 6PLY 4X4

## (undated) DEVICE — SLEEVE SCD EXPRESS CALF MEDIUM

## (undated) DEVICE — SUT 2-0 VICRYL / CT-1

## (undated) DEVICE — DRESSING N ADH OIL EMUL 3X8 3S

## (undated) DEVICE — SOL 9P NACL IRR PIC IL

## (undated) DEVICE — PAD ABD 8X10 STERILE

## (undated) DEVICE — STRAP OR TABLE 5IN X 72IN

## (undated) DEVICE — APPLICATOR CHLORAPREP ORN 26ML

## (undated) DEVICE — ALCOHOL 70% ISOP RUBBING 4OZ

## (undated) DEVICE — DRESSING N ADH OIL EMUL 3X8

## (undated) DEVICE — SEE MEDLINE ITEM 157131

## (undated) DEVICE — SEE MEDLINE ITEM 152530

## (undated) DEVICE — WIRE GUIDE 3.2MM 400MM
Type: IMPLANTABLE DEVICE | Site: FEMUR | Status: NON-FUNCTIONAL
Removed: 2019-08-18

## (undated) DEVICE — PACK CUSTOM UNIV BASIN SLI

## (undated) DEVICE — SEE MEDLINE ITEM 152622

## (undated) DEVICE — SUT MONOCRYL 3-0 PS-1

## (undated) DEVICE — SEE MEDLINE ITEM 157118

## (undated) DEVICE — SEE MEDLINE ITEM 157117

## (undated) DEVICE — PAD CAST SPECIALIST STRL 6

## (undated) DEVICE — SEE MEDLINE ITEM 146231

## (undated) DEVICE — SUT 0 VICRYL / CT-1

## (undated) DEVICE — UNDERGLOVES BIOGEL PI SIZE 8.5

## (undated) DEVICE — DRAPE INCISE IOBAN 2 23X17IN